# Patient Record
Sex: FEMALE | Race: WHITE | NOT HISPANIC OR LATINO | Employment: UNEMPLOYED | URBAN - METROPOLITAN AREA
[De-identification: names, ages, dates, MRNs, and addresses within clinical notes are randomized per-mention and may not be internally consistent; named-entity substitution may affect disease eponyms.]

---

## 2023-08-15 PROBLEM — R10.2 PELVIC PAIN: Status: ACTIVE | Noted: 2023-08-15

## 2023-08-15 PROBLEM — C50.919 BREAST CANCER (MULTI): Status: ACTIVE | Noted: 2023-08-15

## 2023-08-15 PROBLEM — N95.2 ATROPHY OF VAGINA: Status: ACTIVE | Noted: 2023-08-15

## 2023-08-15 PROBLEM — M81.8 OTHER OSTEOPOROSIS WITHOUT CURRENT PATHOLOGICAL FRACTURE: Status: ACTIVE | Noted: 2020-10-16

## 2023-08-15 PROBLEM — N95.0 POSTMENOPAUSAL BLEEDING: Status: ACTIVE | Noted: 2023-08-15

## 2023-08-15 PROBLEM — M79.643 HAND PAIN: Status: ACTIVE | Noted: 2023-08-15

## 2023-08-15 PROBLEM — R93.89 ENDOMETRIAL THICKENING ON ULTRASOUND: Status: ACTIVE | Noted: 2023-08-15

## 2023-08-15 PROBLEM — N39.0 URINARY TRACT INFECTION: Status: ACTIVE | Noted: 2023-08-15

## 2023-08-15 PROBLEM — Z90.13 ACQUIRED ABSENCE OF BILATERAL BREASTS AND NIPPLES: Status: ACTIVE | Noted: 2020-10-16

## 2023-08-15 PROBLEM — R35.0 URINARY FREQUENCY: Status: ACTIVE | Noted: 2023-08-15

## 2023-08-15 PROBLEM — R10.9 ABDOMINAL PAIN: Status: ACTIVE | Noted: 2023-08-15

## 2023-08-15 PROBLEM — M85.80 OSTEOPENIA: Status: ACTIVE | Noted: 2023-08-15

## 2023-08-15 RX ORDER — ASPIRIN 81 MG/1
81 TABLET ORAL
COMMUNITY

## 2023-08-15 RX ORDER — CETIRIZINE HYDROCHLORIDE, PSEUDOEPHEDRINE HYDROCHLORIDE 5; 120 MG/1; MG/1
1 TABLET, FILM COATED, EXTENDED RELEASE ORAL EVERY 12 HOURS
COMMUNITY
Start: 2014-05-19 | End: 2023-10-02

## 2023-08-15 RX ORDER — CALCIUM CARBONATE 600 MG
600 TABLET ORAL
COMMUNITY
Start: 2020-10-16 | End: 2023-10-02

## 2023-08-15 RX ORDER — ALENDRONATE SODIUM 70 MG/1
70 TABLET ORAL
COMMUNITY
Start: 2015-01-08

## 2023-08-15 RX ORDER — VIT C/E/ZN/COPPR/LUTEIN/ZEAXAN 250MG-90MG
25 CAPSULE ORAL
COMMUNITY

## 2023-08-15 RX ORDER — MULTIVITAMIN
TABLET ORAL
COMMUNITY

## 2023-08-15 RX ORDER — ANASTROZOLE 1 MG/1
1 TABLET ORAL
COMMUNITY
Start: 2013-08-01 | End: 2023-10-02

## 2023-10-02 ENCOUNTER — OFFICE VISIT (OUTPATIENT)
Dept: OBSTETRICS AND GYNECOLOGY | Facility: CLINIC | Age: 58
End: 2023-10-02
Payer: COMMERCIAL

## 2023-10-02 VITALS
BODY MASS INDEX: 26.2 KG/M2 | HEIGHT: 66 IN | DIASTOLIC BLOOD PRESSURE: 66 MMHG | SYSTOLIC BLOOD PRESSURE: 115 MMHG | WEIGHT: 163 LBS

## 2023-10-02 DIAGNOSIS — Z01.419 ENCOUNTER FOR GYNECOLOGICAL EXAMINATION WITHOUT ABNORMAL FINDING: Primary | ICD-10-CM

## 2023-10-02 PROCEDURE — 99396 PREV VISIT EST AGE 40-64: CPT | Performed by: OBSTETRICS & GYNECOLOGY

## 2023-10-02 PROCEDURE — 1036F TOBACCO NON-USER: CPT | Performed by: OBSTETRICS & GYNECOLOGY

## 2023-10-02 NOTE — PROGRESS NOTES
Subjective   Adrianna Quinones is a 57 y.o. female here for a routine exam. Current complaints: She has no concerns.  She has a history of bilateral mastectomies and no longer needs breast imaging.  She stopped anastrozole in December 2022.  She has been on Fosamax for 10 years, the same amount of time as anastrozole.  Her last bone density in January 2023 showed osteopenia, T score -1.6.. Personal health questionnaire reviewed: yes.     Gynecologic History  Patient's last menstrual period was 08/01/2011.  Contraception: post menopausal status  Last Pap: 9/26/22. Results were: normal  Last mammogram: none. Results were:  Bilateral mastectomies.    Obstetric History  OB History   No obstetric history on file.       Objective   Constitutional: Alert and in no acute distress. Well developed, well nourished.   Head and Face: Head and face: Normal.    Eyes: Normal external exam - nonicteric sclera, extraocular movements intact (EOMI) and no ptosis.   Neck: No neck asymmetry. Supple. Thyroid not enlarged and there were no palpable thyroid nodules.    Pulmonary: No respiratory distress.   Chest: Breasts: Consistent with bilateral mastectomies with implants, no skin changes. Palpation of breasts and axillae: No palpable mass and no axillary lymphadenopathy.   Abdomen: Soft nontender; no abdominal mass palpated. No organomegaly. No hernias.   Genitourinary: External genitalia: Normal. No inguinal lymphadenopathy. Bartholin's Urethral and Skenes Glands: Normal. Urethra: Normal.  Bladder: Normal on palpation. Vagina: Normal. Cervix: Normal.  Uterus: Normal.  Right Adnexa/parametria: Normal.  Left Adnexa/parametria: Normal.  Inspection of Perianal Area: Normal.  No Pap smear sent..  Musculoskeletal: No joint swelling seen, normal movements of all extremities.   Skin: Normal skin color and pigmentation, normal skin turgor, and no rash.   Neurologic: Non-focal. Grossly intact.   Psychiatric: Alert and oriented x 3. Affect normal to  patient baseline. Mood: Appropriate.  Physical Exam     Assessment/Plan   Healthy female exam.    Education reviewed: calcium supplements and we discussed a bone density test is due in January 2025.  As she has been on Fosamax for 10 years she can discontinue.  This will coincide with stopping anastrozole.  She will call me with any concerns, I will see her routinely in 1 year.  No Pap smear was sent..

## 2023-10-11 ENCOUNTER — LAB (OUTPATIENT)
Dept: LAB | Facility: LAB | Age: 58
End: 2023-10-11
Payer: COMMERCIAL

## 2023-10-11 DIAGNOSIS — Z13.6 SCREENING FOR ISCHEMIC HEART DISEASE: ICD-10-CM

## 2023-10-11 DIAGNOSIS — R73.01 IMPAIRED FASTING GLUCOSE: ICD-10-CM

## 2023-10-11 LAB
ALBUMIN SERPL BCP-MCNC: 4.5 G/DL (ref 3.4–5)
ALP SERPL-CCNC: 61 U/L (ref 33–110)
ALT SERPL W P-5'-P-CCNC: 13 U/L (ref 7–45)
ANION GAP SERPL CALC-SCNC: 11 MMOL/L (ref 10–20)
AST SERPL W P-5'-P-CCNC: 14 U/L (ref 9–39)
BILIRUB SERPL-MCNC: 0.6 MG/DL (ref 0–1.2)
BUN SERPL-MCNC: 15 MG/DL (ref 6–23)
CALCIUM SERPL-MCNC: 9.8 MG/DL (ref 8.6–10.3)
CHLORIDE SERPL-SCNC: 104 MMOL/L (ref 98–107)
CHOLEST SERPL-MCNC: 215 MG/DL (ref 0–199)
CHOLESTEROL/HDL RATIO: 3
CO2 SERPL-SCNC: 29 MMOL/L (ref 21–32)
CREAT SERPL-MCNC: 0.85 MG/DL (ref 0.5–1.05)
EST. AVERAGE GLUCOSE BLD GHB EST-MCNC: 126 MG/DL
GFR SERPL CREATININE-BSD FRML MDRD: 80 ML/MIN/1.73M*2
GLUCOSE SERPL-MCNC: 112 MG/DL (ref 74–99)
HBA1C MFR BLD: 6 %
HDLC SERPL-MCNC: 70.5 MG/DL
LDLC SERPL CALC-MCNC: 125 MG/DL (ref 140–190)
NON HDL CHOLESTEROL: 145 MG/DL (ref 0–149)
POTASSIUM SERPL-SCNC: 5 MMOL/L (ref 3.5–5.3)
PROT SERPL-MCNC: 6.7 G/DL (ref 6.4–8.2)
SODIUM SERPL-SCNC: 139 MMOL/L (ref 136–145)
TRIGL SERPL-MCNC: 96 MG/DL (ref 0–149)
VLDL: 19 MG/DL (ref 0–40)

## 2023-10-11 PROCEDURE — 80061 LIPID PANEL: CPT

## 2023-10-11 PROCEDURE — 83036 HEMOGLOBIN GLYCOSYLATED A1C: CPT

## 2023-10-11 PROCEDURE — 80053 COMPREHEN METABOLIC PANEL: CPT

## 2023-10-11 PROCEDURE — 36415 COLL VENOUS BLD VENIPUNCTURE: CPT

## 2023-10-18 ENCOUNTER — OFFICE VISIT (OUTPATIENT)
Dept: PRIMARY CARE | Facility: CLINIC | Age: 58
End: 2023-10-18
Payer: COMMERCIAL

## 2023-10-18 VITALS
SYSTOLIC BLOOD PRESSURE: 120 MMHG | OXYGEN SATURATION: 98 % | HEART RATE: 78 BPM | BODY MASS INDEX: 27.32 KG/M2 | DIASTOLIC BLOOD PRESSURE: 74 MMHG | TEMPERATURE: 97.6 F | HEIGHT: 65 IN | WEIGHT: 164 LBS

## 2023-10-18 DIAGNOSIS — E78.5 HYPERLIPIDEMIA, UNSPECIFIED HYPERLIPIDEMIA TYPE: Chronic | ICD-10-CM

## 2023-10-18 DIAGNOSIS — R73.03 PREDIABETES: Chronic | ICD-10-CM

## 2023-10-18 DIAGNOSIS — Z85.3 HISTORY OF BREAST CANCER: ICD-10-CM

## 2023-10-18 DIAGNOSIS — Z00.00 WELLNESS EXAMINATION: Primary | ICD-10-CM

## 2023-10-18 DIAGNOSIS — M81.0 AGE-RELATED OSTEOPOROSIS WITHOUT CURRENT PATHOLOGICAL FRACTURE: ICD-10-CM

## 2023-10-18 DIAGNOSIS — Z90.13 ACQUIRED ABSENCE OF BILATERAL BREASTS AND NIPPLES: ICD-10-CM

## 2023-10-18 PROBLEM — R35.0 URINARY FREQUENCY: Status: RESOLVED | Noted: 2023-08-15 | Resolved: 2023-10-18

## 2023-10-18 PROBLEM — N95.0 POSTMENOPAUSAL BLEEDING: Status: RESOLVED | Noted: 2023-08-15 | Resolved: 2023-10-18

## 2023-10-18 PROBLEM — M85.80 OSTEOPENIA: Chronic | Status: ACTIVE | Noted: 2023-08-15

## 2023-10-18 PROCEDURE — 1036F TOBACCO NON-USER: CPT | Performed by: FAMILY MEDICINE

## 2023-10-18 PROCEDURE — 99213 OFFICE O/P EST LOW 20 MIN: CPT | Performed by: FAMILY MEDICINE

## 2023-10-18 PROCEDURE — 99396 PREV VISIT EST AGE 40-64: CPT | Performed by: FAMILY MEDICINE

## 2023-10-18 NOTE — PROGRESS NOTES
"Subjective   Patient ID: Adrianna Quinones is a 57 y.o. female who presents for Osteoarthritis (Recheck. Review labs) and Annual Exam (Cpe.).    Adrianna presents for annual exam. She has been feeling well.No new concerns.          Review of Systems   Constitutional:  Negative for appetite change, chills, fatigue and fever.   HENT:  Negative for congestion, rhinorrhea, sore throat and voice change.    Eyes:  Negative for visual disturbance.   Respiratory:  Negative for cough and shortness of breath.    Cardiovascular:  Negative for chest pain and palpitations.   Gastrointestinal:  Negative for abdominal pain, constipation, diarrhea, nausea and vomiting.   Genitourinary:  Negative for dysuria.   Skin:  Negative for rash.   Neurological:  Negative for dizziness.   Psychiatric/Behavioral:  Negative for sleep disturbance.        Objective   /74   Pulse 78   Temp 36.4 °C (97.6 °F)   Ht 1.651 m (5' 5\")   Wt 74.4 kg (164 lb)   LMP 08/01/2011   SpO2 98%   BMI 27.29 kg/m²     Physical Exam  Constitutional:       General: She is not in acute distress.     Appearance: Normal appearance.   HENT:      Head: Normocephalic.      Nose: No congestion.      Mouth/Throat:      Mouth: Mucous membranes are moist.   Eyes:      Extraocular Movements: Extraocular movements intact.      Conjunctiva/sclera: Conjunctivae normal.   Cardiovascular:      Rate and Rhythm: Normal rate and regular rhythm.      Heart sounds: No murmur heard.  Pulmonary:      Effort: Pulmonary effort is normal.      Breath sounds: No wheezing or rhonchi.   Abdominal:      Palpations: Abdomen is soft.      Tenderness: There is no abdominal tenderness.   Musculoskeletal:         General: No swelling or tenderness.   Skin:     General: Skin is warm and dry.   Neurological:      General: No focal deficit present.      Mental Status: She is alert.   Psychiatric:         Mood and Affect: Mood normal.         Behavior: Behavior normal.         Assessment/Plan "   Problem List Items Addressed This Visit             ICD-10-CM    Acquired absence of bilateral breasts and nipples Z90.13    History of breast cancer Z85.3    Age-related osteoporosis without current pathological fracture (Chronic) M81.0    Prediabetes (Chronic) R73.03     Counseled on lifestyle changes. Patient will work with daughter who is dietician.          Relevant Orders    Comprehensive Metabolic Panel    Hemoglobin A1C    Hyperlipidemia (Chronic) E78.5     Lipid panel reviewed. ASCVD risk score 2.1%. Statin not indicated.          Relevant Orders    Lipid Panel    Comprehensive Metabolic Panel     Other Visit Diagnoses         Codes    Wellness examination    -  Primary Z00.00    Vaccines reviewed. Discussed option for booster for hepatitis B and MMR. Due for Tdap vaccine.

## 2023-10-18 NOTE — PATIENT INSTRUCTIONS
Check out red yeast rice supplement for cholesterol.     Go to pharmacy for Tdap vaccine (tetanus booster)

## 2023-10-19 ASSESSMENT — ENCOUNTER SYMPTOMS
DIARRHEA: 0
SORE THROAT: 0
PALPITATIONS: 0
CONSTIPATION: 0
DIZZINESS: 0
CHILLS: 0
RHINORRHEA: 0
SHORTNESS OF BREATH: 0
FATIGUE: 0
NAUSEA: 0
VOMITING: 0
DYSURIA: 0
SLEEP DISTURBANCE: 0
VOICE CHANGE: 0
COUGH: 0
APPETITE CHANGE: 0
FEVER: 0
ABDOMINAL PAIN: 0

## 2023-12-07 ENCOUNTER — OFFICE VISIT (OUTPATIENT)
Dept: HEMATOLOGY/ONCOLOGY | Facility: CLINIC | Age: 58
End: 2023-12-07
Payer: COMMERCIAL

## 2023-12-07 VITALS
WEIGHT: 164.9 LBS | BODY MASS INDEX: 27.44 KG/M2 | DIASTOLIC BLOOD PRESSURE: 83 MMHG | TEMPERATURE: 97.3 F | HEART RATE: 90 BPM | SYSTOLIC BLOOD PRESSURE: 144 MMHG

## 2023-12-07 DIAGNOSIS — Z85.3 HISTORY OF CANCER OF RIGHT BREAST: Primary | ICD-10-CM

## 2023-12-07 DIAGNOSIS — Z92.21 HISTORY OF AROMATASE INHIBITOR THERAPY: ICD-10-CM

## 2023-12-07 PROCEDURE — 99213 OFFICE O/P EST LOW 20 MIN: CPT | Performed by: NURSE PRACTITIONER

## 2023-12-07 PROCEDURE — 1036F TOBACCO NON-USER: CPT | Performed by: NURSE PRACTITIONER

## 2023-12-07 ASSESSMENT — PAIN SCALES - GENERAL: PAINLEVEL: 0-NO PAIN

## 2023-12-07 NOTE — PATIENT INSTRUCTIONS
1. Exercise 2.5 hours per week; bone strengthening, cardio-vascular, resistance training.  2. Please do self breast exams monthly.  3. Keep alcohol under 3 drinks per week.  4. Sun safety - limit sun exposure from 11a-2p when its at its hottest, apply 15-30 sun block and re-apply every 1-2 hours if perspiring or swimming.  5. Eat a plant based diet, add in oily fishes such as mackerel, tuna, and salmon.  6. Get in at least 1,000 mg of calcium per day through diet or supplement for bone strength. Examples of foods higher in calcium are milk, yogurt, fruited yogurt, oranges, fortified orange juice, almonds, almond milk, broccoli, spinach, bok francoise, mustard greens, puddings, custards, ice cream, fortified cereals, bars, and crackers.   7. Stop Fosamax once you complete your current bottle. Bone density testing can be done through Dr. Gaston.  8. You can consider a rupture protocol MRI in the future since the implants are over 10 years old.  9. Please call the office if any new mass or rash in or around breast, or any uncontrolled symptoms that last over 2-3 weeks at [phone].  10. It was nice seeing you today, Adrianna. I will see you back on an as needed basis. Please don't hesitate to call or reach me through [employer] if you have any concerns. It has been my privilege to have been your oncology provider, albeit for one year! I wish you the very best, Adrianna.

## 2023-12-07 NOTE — PROGRESS NOTES
Oncology Follow-Up    Adrianna Quinones  [id number]        Breast Cancer         AJCC Edition: 7th, Diagnosis Date: 22-Jun-2011, Stage IIA, T1c pN1 M0   Oncology History    No history exists.     Treatment Synopsis:       1. Multifocal right-sided breast cancer with 2 areas of invasion measuring 1.2 cm and 0.7 cm respectively. Modified Madrid-Meyers grade 2. ER strongly positive at 100%, TX strongly positive at  100%, and HER-2/cedric negative by FISH with an amplification of 1.2.   2. Status post bilateral mastectomies in July 2011. Holcomb lymph node evaluation positive for 1 of 2 sentinel lymph nodes involved with cancer with evidence of extracapsular extension. Axillary lymph node dissection of 34 other lymph nodes negative  for metastases.   3. Status post 6 cycles of adjuvant TAC chemotherapy.   4. Status post adjuvant radiation therapy to the right mastectomy site.   5. Started on tamoxifen in April 2011 which was later changed to anastrozole in July 2012. Treatment completed December 2022.      Cleveland Rodas presents for her yearly oncology visit accompanied by her . She feels well. Her only new issue is that she recently had some moles removed. She sees dermatology as she has had pre-cancerous moles in the past. Adrianna rates her energy level as 9/10 and her distress score is 2/10 not related to cancer. Adrianna asks about coming off of fosamax. She has been on it since she started anastrozole. Adrianna denies any unusual headaches, balance issues, depression, cough, shortness of breath, problems swallowing, changes in chest/breast area, abdominal pain, bone or muscle pain, vaginal bleeding, rectal bleeding, blood in the urine, vaginal dryness, swelling arms or legs, new or unusual skin moles or lesions.         Objective      Vitals:    12/07/23 0946   BP: 144/83   Pulse: 90   Temp: 36.3 °C (97.3 °F)        Constitutional: Well developed, alert/oriented x3, no distress, cooperative   Eyes: clear sclera  "  ENMT: mucous membranes moist, no apparent lesions   Head/Neck: Neck supple, no bruits   Respiratory/Thorax: Patent airways, normal breath sounds with good chest expansion   Cardiovascular: Regular rate and rhythm, no murmurs, 2+ equal pulses of the extremities,   Gastrointestinal: Nondistended, soft, non-tender, no masses palpable, no organomegaly   Musculoskeletal: ROM intact, no joint swelling, normal strength   Extremities: normal extremities, no edema, cyanosis, contusions or wounds   Neurological: alert and oriented x3,  normal strength   Breast: bilateral mastectomy with implant reconstruction without masses, nodules, skin changes. Right breast with more central scarring   Lymphatic: No significant lymphadenopathy   Psychological: Appropriate mood and behavior   Skin: Warm and dry, no lesions, no rashes      Physical Exam     No results found for: \"WBC\", \"HGB\", \"HCT\", \"MCV\", \"PLT\"    Chemistry    Lab Results   Component Value Date/Time     10/11/2023 0901    K 5.0 10/11/2023 0901     10/11/2023 0901    CO2 29 10/11/2023 0901    BUN 15 10/11/2023 0901    CREATININE 0.85 10/11/2023 0901    Lab Results   Component Value Date/Time    CALCIUM 9.8 10/11/2023 0901    ALKPHOS 61 10/11/2023 0901    AST 14 10/11/2023 0901    ALT 13 10/11/2023 0901    BILITOT 0.6 10/11/2023 0901              Imaging:  Name:          SUBHA SALGADO  Patient ID:    [id number]  Age:           57  Sex:           Female  Ethnicity:     White  YOB: 1965      -----------------------------------------------------------------     Indication: osteopenia; prior fracture;     Referring Provider: RAFA GIMENEZ     Study: Bone densitometry was performed.     Exam Date: January 09, 2023     Accession number: [id number]        Bone Density:  -----------------------------------------------------------------  Region                   BMD    T-score  Z-score   " Classification  -----------------------------------------------------------------  AP Spine(L1-L4)          0.875   -1.6     -0.3       Osteopenia  Femoral Neck (Left)      0.722   -1.1      0.0       Osteopenia  Total Hip (Left)         0.887   -0.4      0.4       Normal  -----------------------------------------------------------------     World Health Organization criteria for BMD impression   classify patients as:   Normal (T-score at or above -1.0),   Osteopenia (T-score between -1.0 and -2.5), or   Osteoporosis (T-score at or below -2.5).      10-year Fracture Risk(1):  -----------------------------------------------------------------  Major Osteoporotic Fracture            12%  Hip Fracture                           0.7%  -----------------------------------------------------------------  Reported Risk Factors:  [place] (), Neck BMD=0.722, BMI=25.8, previous fracture  (1) FRAX(R) Version 3.08. Fracture probability calculated for an   untreated patient. Fracture probability may be lower if the   patient has received treatment.        Previous Exams:  -----------------------------------------------------------------  Region  Exam     Age  BMD   T-score  BMD Change     BMD Change           Date          g/cm2          vs Baseline    vs Previous  -----------------------------------------------------------------  AP Spine(L1-L4)       01/09/2023  57  0.875    -1.6      -16.7%         4.3%*            12/29/2020  55  0.839    -1.9      -20.1%         -0.5%            12/18/2018  53  0.843    -1.9      -19.7%         -10.6%*          01/03/2012  46  0.943    -0.9      -10.2%         -9.7%            05/22/2008  42  1.045     0.0      -0.5%          -0.5%            02/24/2003  37  1.050     0.0                                    Total Hip(Left)       01/09/2023  57  0.887    -0.4      15.5%           1.3%            12/29/2020  55  0.876    -0.5      14.1%          -1.8%            12/18/2018  53  0.892    -0.4       16.1%          28.1%*           01/03/2012  46  0.696    -2.0      -9.3%          -4.0%            05/22/2008  42  0.725    -1.8      -5.6%*         -5.6%*           02/24/2003  37  0.768    -1.4                                 -----------------------------------------------------------------  *Denotes significance at 95% confidence level, LSC for AP Spine   = 0.022 g/cm2,  LSC for Total Hip = 0.027 g/cm2            Impression: The patient has low bone mass, based on the Total   Spine T-score. The patient has an estimated ten-year risk of   hip fracture of 0.7% and an estimated ten-year risk of major   fracture of 12%, based on the WHO FRAX algorithm. The patient   has risk factors, includi    Assessment/Plan    Adrianna is a 59 yo woman with a remote hx of T1cN1 right multifocal breast cancer diagnosed in June 2011. She is s/p bilatearl mastectomy with silicone implant reconstruction, TAC chemotherapy, XRT, and completed 10 years of anastrozole in December 2022. There is no evidence of recurrent disease on today's exam.   Plan:  Exam is negative.  Stop Fosamax as she has been on a long course.   Continue seeing dermatology for hx of pre-cancerous moles  See Dr. Gaston and Dr. Palmer at least yearly.  Can consider an implant protocol MRI since implants are over 10 years old. Prefers to wait a few more years before having them removed.  We reviewed signs/symptoms of recurrence including new masses, new pigmented lesion, tugging or pulling of the skin, nipple discharge, rash in or around the chest area, or any new finding that doesn't resolve within a 2-3 weeks.  Over 20 minutes of time was spent with this patient with >50% of the time with education, counseling, and coordination of care.   Will see Adrianna on an as needed basis moving forward. She will continue oncology follow up with Dr. Gaston. She is aware that I am always available if she has any concerns.    Diagnoses and all orders for this  visit:  History of cancer of right breast  History of aromatase inhibitor therapy          Lamar Villalpando, APRN-CNP

## 2024-10-11 ENCOUNTER — APPOINTMENT (OUTPATIENT)
Dept: OBSTETRICS AND GYNECOLOGY | Facility: CLINIC | Age: 59
End: 2024-10-11
Payer: COMMERCIAL

## 2024-10-11 VITALS
WEIGHT: 157 LBS | HEART RATE: 80 BPM | SYSTOLIC BLOOD PRESSURE: 112 MMHG | DIASTOLIC BLOOD PRESSURE: 75 MMHG | HEIGHT: 66 IN | BODY MASS INDEX: 25.23 KG/M2

## 2024-10-11 DIAGNOSIS — Z01.419 ENCOUNTER FOR GYNECOLOGICAL EXAMINATION WITHOUT ABNORMAL FINDING: ICD-10-CM

## 2024-10-11 DIAGNOSIS — M81.0 AGE-RELATED OSTEOPOROSIS WITHOUT CURRENT PATHOLOGICAL FRACTURE: Primary | Chronic | ICD-10-CM

## 2024-10-11 DIAGNOSIS — Z12.11 SCREEN FOR COLON CANCER: ICD-10-CM

## 2024-10-11 PROCEDURE — 3008F BODY MASS INDEX DOCD: CPT | Performed by: OBSTETRICS & GYNECOLOGY

## 2024-10-11 PROCEDURE — 99396 PREV VISIT EST AGE 40-64: CPT | Performed by: OBSTETRICS & GYNECOLOGY

## 2024-10-11 NOTE — PROGRESS NOTES
Subjective   Adrianna Quinones is a 58 y.o. female here for a routine exam.  She has no postmenopausal bleeding or discharge.  No dysuria or change in bowel habits.  She is current on her colonoscopy.    She has a history of bilateral mastectomy.  She is off anastrozole and has stopped Fosamax.  Her last bone density in 2023 showed osteopenia of the spine, T-score -1.6.    Personal health questionnaire reviewed: yes.     Gynecologic History  Patient's last menstrual period was 2011.  Contraception: post menopausal status  Last Pap: 22. Results were: normal  Last mammogram: History of mastectomy. Results were:  n/a    Obstetric History  OB History    Para Term  AB Living   4 3           SAB IAB Ectopic Multiple Live Births                  # Outcome Date GA Lbr Henrik/2nd Weight Sex Type Anes PTL Lv   4             3 Para            2 Para            1 Para                Objective   Constitutional: Alert and in no acute distress. Well developed, well nourished.   Head and Face: Head and face: Normal.    Eyes: Normal external exam - nonicteric sclera, extraocular movements intact (EOMI) and no ptosis.   Neck: No neck asymmetry. Supple. Thyroid not enlarged and there were no palpable thyroid nodules.    Pulmonary: No respiratory distress.   Chest: Breasts: Normal appearance,  no skin changes. Palpation of breasts and axillae: No palpable mass and no axillary lymphadenopathy.  Consistent with bilateral mastectomy.  Abdomen: Soft nontender; no abdominal mass palpated. No organomegaly. No hernias.   Genitourinary: External genitalia: Normal. No inguinal lymphadenopathy. Bartholin's Urethral and Skenes Glands: Normal. Urethra: Normal.  Bladder: Normal on palpation. Vagina: Normal. Cervix: Normal.  Uterus: Normal.  Right Adnexa/parametria: Normal.  Left Adnexa/parametria: Normal.  Inspection of Perianal Area: Normal.   Musculoskeletal: No joint swelling seen, normal movements of all  extremities.   Skin: Normal skin color and pigmentation, normal skin turgor, and no rash.   Neurologic: Non-focal. Grossly intact.   Psychiatric: Alert and oriented x 3. Affect normal to patient baseline. Mood: Appropriate.  Physical Exam     Assessment/Plan   Healthy female exam.  This is a 58-year-old female with a normal exam.  No Pap smear was sent, she is high risk HPV negative and 2022.    Her bone density is due in January 2025 and this was ordered to monitor the osteopenia.    We discussed colon cancer screening options and a colonoscopy was ordered.    I will see her in 1 year.  Education reviewed: self breast exams.

## 2024-10-14 ENCOUNTER — LAB (OUTPATIENT)
Dept: LAB | Facility: LAB | Age: 59
End: 2024-10-14
Payer: COMMERCIAL

## 2024-10-14 DIAGNOSIS — R73.03 PREDIABETES: Chronic | ICD-10-CM

## 2024-10-14 DIAGNOSIS — E78.5 HYPERLIPIDEMIA, UNSPECIFIED HYPERLIPIDEMIA TYPE: Chronic | ICD-10-CM

## 2024-10-14 LAB
ALBUMIN SERPL BCP-MCNC: 4.4 G/DL (ref 3.4–5)
ALP SERPL-CCNC: 51 U/L (ref 33–110)
ALT SERPL W P-5'-P-CCNC: 13 U/L (ref 7–45)
ANION GAP SERPL CALC-SCNC: 10 MMOL/L (ref 10–20)
AST SERPL W P-5'-P-CCNC: 14 U/L (ref 9–39)
BILIRUB SERPL-MCNC: 0.6 MG/DL (ref 0–1.2)
BUN SERPL-MCNC: 19 MG/DL (ref 6–23)
CALCIUM SERPL-MCNC: 9.8 MG/DL (ref 8.6–10.3)
CHLORIDE SERPL-SCNC: 102 MMOL/L (ref 98–107)
CHOLEST SERPL-MCNC: 183 MG/DL (ref 0–199)
CHOLESTEROL/HDL RATIO: 2.9
CO2 SERPL-SCNC: 31 MMOL/L (ref 21–32)
CREAT SERPL-MCNC: 0.75 MG/DL (ref 0.5–1.05)
EGFRCR SERPLBLD CKD-EPI 2021: >90 ML/MIN/1.73M*2
GLUCOSE SERPL-MCNC: 101 MG/DL (ref 74–99)
HDLC SERPL-MCNC: 63.6 MG/DL
LDLC SERPL CALC-MCNC: 99 MG/DL
NON HDL CHOLESTEROL: 119 MG/DL (ref 0–149)
POTASSIUM SERPL-SCNC: 4.4 MMOL/L (ref 3.5–5.3)
PROT SERPL-MCNC: 7 G/DL (ref 6.4–8.2)
SODIUM SERPL-SCNC: 139 MMOL/L (ref 136–145)
TRIGL SERPL-MCNC: 100 MG/DL (ref 0–149)
VLDL: 20 MG/DL (ref 0–40)

## 2024-10-14 PROCEDURE — 36415 COLL VENOUS BLD VENIPUNCTURE: CPT

## 2024-10-14 PROCEDURE — 83036 HEMOGLOBIN GLYCOSYLATED A1C: CPT

## 2024-10-14 PROCEDURE — 80061 LIPID PANEL: CPT

## 2024-10-14 PROCEDURE — 80053 COMPREHEN METABOLIC PANEL: CPT

## 2024-10-15 LAB
EST. AVERAGE GLUCOSE BLD GHB EST-MCNC: 128 MG/DL
HBA1C MFR BLD: 6.1 %

## 2024-10-17 ENCOUNTER — PREP FOR PROCEDURE (OUTPATIENT)
Dept: SURGERY | Facility: HOSPITAL | Age: 59
End: 2024-10-17
Payer: COMMERCIAL

## 2024-10-17 DIAGNOSIS — Z85.3 HISTORY OF RIGHT BREAST CANCER: Primary | ICD-10-CM

## 2024-10-18 NOTE — H&P (VIEW-ONLY)
History and Physical      Patient Info: Adrianna Quinones   : 1965      Vital Signs:         Date & Reason of Surgery:         Chief Complaint:         Past Medical History:   Right breast cancer diagnosed , G5, P3, 2 miscarriages within the first trimester.       Past Surgical History:   Bilateral mastectomy with right sentinel lymph node biopsy in , bilateral implant-based reconstruction  with IZA Garcia      Allergies:   sulfa - hives      Medications:   Vitamins, calcium      Family History:   Denies known family history of breast disease, breast cancer, endocrine cancer, bleeding or clotting disorders or problems with anesthesia.      Social History:   Presents today with , [employer].  Denies tobacco, nicotine, recreational drugs.  Does consume alcohol on weekends.  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  [employer].      Review of Systems   Denies GI, , Respiratory, Cardiovascular, Endocrine, MSK, Neurologic, Immune, or Rheumatologic complaints.   System Normal Abnormal Comments   Skin [] []    HEENT [] []    Neck [] []    Chest [] [] no palpable masses, no axillary lymphadenopathy.  Healed surgical scars including transverse scars of the bilateral breast with nipple reconstruction more prominent on the left wiht loss of projection on the right.  There is skin contour irregularity on the anterior/lower pole of the right, radiated breast.  Grade 3 capsule with superior displacement of the implant on the right, grade 1 capsule on the left.  There is natural appearing ptosis of the left breast with slight contour irregularity just below the transverse incision and nipple reconstruction.  Submuscular implants with animation deformity.   Abdomen [] []    Well-appearing woman consistent with stated age.  Normal affect with appropriate responses.  Normal work of breathing with even quiet chest rise on room air.  Warm extremities, normal cap refill, no pedal edema.    SN-N   L  N-IMF 5/8 L  BW 12-13  Estimated implant size 250-350 cc   Impression / Plan:     1. Asymmetry of reconstructed breasts with history of right breast cancer and radiation treatment  2. Desire for larger breasts as well as improvement in asymmetry of reconstruction     Bilateral implant exchange for larger (by about 150-200 cc) silicone breast implants  Prefers natural look - responsive gel  Significant capsule modification of bilateral implant pockets right > left with possible use of ADM  Fat grafting to breasts right>left from abdomen/flanks  Possible nipple reconstruction revision    Risks of liposuction include but are not limited to: bleeding/hematoma, seroma, infection, need for reoperation, poor scars, injury to nearby structures, asymmetry, DVT/PE, under or over-correction, contour irregularity, poor cosmetic result. Risks of fat transfer include infection, poor take, fat necrosis, oil cyst, irregularity, palpability and others.    We discussed risks of breast implants, including cosmetic risks such as malposition, rippling, animation deformity.  We also discussed risks of capsular contracture, hematoma, seroma, infection, need for reoperation, implant rupture.  Additional risks including breast implant illness, breast implant associated anaplastic large cell lymphoma, and my recommendation that the patient plan for exchange or revisionary procedures in 10-year increments because implants are not meant to be permanent devices.  We also reviewed FDA guidelines that patient's undergoing breast augmentation with silicone implants should have an MRI performed 5 to 6 years following augmentation, and every 2 to 3 years following this.    Provider Signature:     Electronically signed by Lorrie Laguna  on 10/18/2024 at 7:52 AM

## 2024-10-18 NOTE — H&P
History and Physical      Patient Info: Adrianna Quinones   : 1965      Vital Signs:         Date & Reason of Surgery:         Chief Complaint:         Past Medical History:   Right breast cancer diagnosed , G5, P3, 2 miscarriages within the first trimester.       Past Surgical History:   Bilateral mastectomy with right sentinel lymph node biopsy in , bilateral implant-based reconstruction  with IZA Garcia      Allergies:   sulfa - hives      Medications:   Vitamins, calcium      Family History:   Denies known family history of breast disease, breast cancer, endocrine cancer, bleeding or clotting disorders or problems with anesthesia.      Social History:   Presents today with , [employer].  Denies tobacco, nicotine, recreational drugs.  Does consume alcohol on weekends.  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  [employer].      Review of Systems   Denies GI, , Respiratory, Cardiovascular, Endocrine, MSK, Neurologic, Immune, or Rheumatologic complaints.   System Normal Abnormal Comments   Skin [] []    HEENT [] []    Neck [] []    Chest [] [] no palpable masses, no axillary lymphadenopathy.  Healed surgical scars including transverse scars of the bilateral breast with nipple reconstruction more prominent on the left wiht loss of projection on the right.  There is skin contour irregularity on the anterior/lower pole of the right, radiated breast.  Grade 3 capsule with superior displacement of the implant on the right, grade 1 capsule on the left.  There is natural appearing ptosis of the left breast with slight contour irregularity just below the transverse incision and nipple reconstruction.  Submuscular implants with animation deformity.   Abdomen [] []    Well-appearing woman consistent with stated age.  Normal affect with appropriate responses.  Normal work of breathing with even quiet chest rise on room air.  Warm extremities, normal cap refill, no pedal edema.    SN-N   L  N-IMF 5/8 L  BW 12-13  Estimated implant size 250-350 cc   Impression / Plan:     1. Asymmetry of reconstructed breasts with history of right breast cancer and radiation treatment  2. Desire for larger breasts as well as improvement in asymmetry of reconstruction     Bilateral implant exchange for larger (by about 150-200 cc) silicone breast implants  Prefers natural look - responsive gel  Significant capsule modification of bilateral implant pockets right > left with possible use of ADM  Fat grafting to breasts right>left from abdomen/flanks  Possible nipple reconstruction revision    Risks of liposuction include but are not limited to: bleeding/hematoma, seroma, infection, need for reoperation, poor scars, injury to nearby structures, asymmetry, DVT/PE, under or over-correction, contour irregularity, poor cosmetic result. Risks of fat transfer include infection, poor take, fat necrosis, oil cyst, irregularity, palpability and others.    We discussed risks of breast implants, including cosmetic risks such as malposition, rippling, animation deformity.  We also discussed risks of capsular contracture, hematoma, seroma, infection, need for reoperation, implant rupture.  Additional risks including breast implant illness, breast implant associated anaplastic large cell lymphoma, and my recommendation that the patient plan for exchange or revisionary procedures in 10-year increments because implants are not meant to be permanent devices.  We also reviewed FDA guidelines that patient's undergoing breast augmentation with silicone implants should have an MRI performed 5 to 6 years following augmentation, and every 2 to 3 years following this.    Provider Signature:     Electronically signed by Lorrie Laguna  on 10/18/2024 at 7:52 AM

## 2024-10-21 ENCOUNTER — APPOINTMENT (OUTPATIENT)
Dept: PRIMARY CARE | Facility: CLINIC | Age: 59
End: 2024-10-21
Payer: COMMERCIAL

## 2024-10-21 VITALS
DIASTOLIC BLOOD PRESSURE: 82 MMHG | SYSTOLIC BLOOD PRESSURE: 124 MMHG | BODY MASS INDEX: 25.07 KG/M2 | HEIGHT: 66 IN | WEIGHT: 156 LBS | OXYGEN SATURATION: 98 % | HEART RATE: 83 BPM | TEMPERATURE: 97.9 F

## 2024-10-21 DIAGNOSIS — R73.03 PREDIABETES: Chronic | ICD-10-CM

## 2024-10-21 DIAGNOSIS — Z00.00 WELLNESS EXAMINATION: Primary | ICD-10-CM

## 2024-10-21 DIAGNOSIS — R19.7 DIARRHEA, UNSPECIFIED TYPE: ICD-10-CM

## 2024-10-21 DIAGNOSIS — E78.5 HYPERLIPIDEMIA, UNSPECIFIED HYPERLIPIDEMIA TYPE: Chronic | ICD-10-CM

## 2024-10-21 PROCEDURE — 99396 PREV VISIT EST AGE 40-64: CPT | Performed by: FAMILY MEDICINE

## 2024-10-21 PROCEDURE — 1036F TOBACCO NON-USER: CPT | Performed by: FAMILY MEDICINE

## 2024-10-21 PROCEDURE — 99213 OFFICE O/P EST LOW 20 MIN: CPT | Performed by: FAMILY MEDICINE

## 2024-10-21 PROCEDURE — 3008F BODY MASS INDEX DOCD: CPT | Performed by: FAMILY MEDICINE

## 2024-10-21 ASSESSMENT — ENCOUNTER SYMPTOMS
NAUSEA: 0
SHORTNESS OF BREATH: 0
PALPITATIONS: 0
APPETITE CHANGE: 0
FEVER: 0
FATIGUE: 0
SORE THROAT: 0
CONSTIPATION: 0
DIZZINESS: 0
ABDOMINAL PAIN: 0
COUGH: 0
DIARRHEA: 0
DYSURIA: 0
RHINORRHEA: 0
VOMITING: 0
CHILLS: 0

## 2024-10-21 ASSESSMENT — PATIENT HEALTH QUESTIONNAIRE - PHQ9
2. FEELING DOWN, DEPRESSED OR HOPELESS: NOT AT ALL
SUM OF ALL RESPONSES TO PHQ9 QUESTIONS 1 AND 2: 0
1. LITTLE INTEREST OR PLEASURE IN DOING THINGS: NOT AT ALL

## 2024-10-21 NOTE — PROGRESS NOTES
"Subjective   Patient ID: Adrianna Quinones is a 58 y.o. female who presents for Annual Exam (Review BW).    Adrianna has been feeling well. Did have a few weeks of diarrhea last month. Started when in [place] for child's wedding. Was feeling very stressed and eating out regularly. Had cramping diarrhea. No blood. Symptoms last about a weeks or two then resolved. No issues since. She is due for screening colonoscopy next year.          Review of Systems   Constitutional:  Negative for appetite change, chills, fatigue and fever.   HENT:  Negative for congestion, rhinorrhea and sore throat.    Eyes:  Negative for visual disturbance.   Respiratory:  Negative for cough and shortness of breath.    Cardiovascular:  Negative for chest pain and palpitations.   Gastrointestinal:  Negative for abdominal pain, constipation, diarrhea, nausea and vomiting.   Genitourinary:  Negative for dysuria.   Skin:  Negative for rash.   Neurological:  Negative for dizziness.       Objective   /82   Pulse 83   Temp 36.6 °C (97.9 °F)   Ht 1.676 m (5' 6\")   Wt 70.8 kg (156 lb)   LMP 08/01/2011   SpO2 98%   BMI 25.18 kg/m²     Physical Exam  Constitutional:       General: She is not in acute distress.  HENT:      Head: Normocephalic and atraumatic.      Right Ear: Tympanic membrane normal.      Left Ear: Tympanic membrane normal.      Nose: No congestion or rhinorrhea.      Mouth/Throat:      Mouth: Mucous membranes are moist.      Pharynx: No oropharyngeal exudate or posterior oropharyngeal erythema.   Eyes:      Extraocular Movements: Extraocular movements intact.   Cardiovascular:      Rate and Rhythm: Normal rate and regular rhythm.      Heart sounds: No murmur heard.  Pulmonary:      Effort: Pulmonary effort is normal.      Breath sounds: No wheezing or rhonchi.   Abdominal:      General: There is no distension.      Palpations: Abdomen is soft.      Tenderness: There is no abdominal tenderness. There is no guarding or rebound. "   Musculoskeletal:         General: No swelling or tenderness.      Cervical back: Neck supple.      Right lower leg: No edema.      Left lower leg: No edema.   Lymphadenopathy:      Cervical: No cervical adenopathy.   Skin:     General: Skin is warm and dry.   Neurological:      Mental Status: She is alert.      Cranial Nerves: No cranial nerve deficit.      Motor: No weakness.      Coordination: Coordination normal.      Gait: Gait normal.   Psychiatric:         Mood and Affect: Mood normal.         Behavior: Behavior normal.         Assessment/Plan   Diagnoses and all orders for this visit:  Wellness examination  Comments:  CPE performed. Recommed Tdap vaccine. Patient declines COVID and influenza vaccines.  Diarrhea, unspecified type  Comments:  Likely due to stress and eating out. Will monitor for recurrence since symptoms resolved at this time.  Prediabetes  Comments:  HgbA1c stable.  Orders:  -     Comprehensive Metabolic Panel; Future  -     Hemoglobin A1C; Future  -     CBC and Auto Differential; Future  Hyperlipidemia, unspecified hyperlipidemia type  Comments:  LDL improved. Statin not indicated.  Orders:  -     Lipid Panel; Future  -     Comprehensive Metabolic Panel; Future

## 2024-10-24 PROBLEM — Z85.3 HISTORY OF RIGHT BREAST CANCER: Status: ACTIVE | Noted: 2024-10-17

## 2024-10-28 ENCOUNTER — PRE-ADMISSION TESTING (OUTPATIENT)
Dept: PREADMISSION TESTING | Facility: HOSPITAL | Age: 59
End: 2024-10-28
Payer: COMMERCIAL

## 2024-10-28 VITALS
OXYGEN SATURATION: 99 % | TEMPERATURE: 96.4 F | HEIGHT: 66 IN | RESPIRATION RATE: 16 BRPM | WEIGHT: 158 LBS | BODY MASS INDEX: 25.39 KG/M2 | SYSTOLIC BLOOD PRESSURE: 120 MMHG | DIASTOLIC BLOOD PRESSURE: 77 MMHG | HEART RATE: 90 BPM

## 2024-10-28 DIAGNOSIS — Z01.810 PREOP CARDIOVASCULAR EXAM: Primary | ICD-10-CM

## 2024-10-28 LAB
BASOPHILS # BLD AUTO: 0.05 X10*3/UL (ref 0–0.1)
BASOPHILS NFR BLD AUTO: 1 %
EOSINOPHIL # BLD AUTO: 0.08 X10*3/UL (ref 0–0.7)
EOSINOPHIL NFR BLD AUTO: 1.7 %
ERYTHROCYTE [DISTWIDTH] IN BLOOD BY AUTOMATED COUNT: 11.4 % (ref 11.5–14.5)
HCT VFR BLD AUTO: 40.6 % (ref 36–46)
HGB BLD-MCNC: 13.1 G/DL (ref 12–16)
IMM GRANULOCYTES # BLD AUTO: 0 X10*3/UL (ref 0–0.7)
IMM GRANULOCYTES NFR BLD AUTO: 0 % (ref 0–0.9)
LYMPHOCYTES # BLD AUTO: 1.98 X10*3/UL (ref 1.2–4.8)
LYMPHOCYTES NFR BLD AUTO: 40.9 %
MCH RBC QN AUTO: 29.3 PG (ref 26–34)
MCHC RBC AUTO-ENTMCNC: 32.3 G/DL (ref 32–36)
MCV RBC AUTO: 91 FL (ref 80–100)
MONOCYTES # BLD AUTO: 0.41 X10*3/UL (ref 0.1–1)
MONOCYTES NFR BLD AUTO: 8.5 %
NEUTROPHILS # BLD AUTO: 2.32 X10*3/UL (ref 1.2–7.7)
NEUTROPHILS NFR BLD AUTO: 47.9 %
NRBC BLD-RTO: 0 /100 WBCS (ref 0–0)
PLATELET # BLD AUTO: 311 X10*3/UL (ref 150–450)
RBC # BLD AUTO: 4.47 X10*6/UL (ref 4–5.2)
WBC # BLD AUTO: 4.8 X10*3/UL (ref 4.4–11.3)

## 2024-10-28 PROCEDURE — 99203 OFFICE O/P NEW LOW 30 MIN: CPT | Performed by: NURSE PRACTITIONER

## 2024-10-28 PROCEDURE — 87081 CULTURE SCREEN ONLY: CPT | Mod: TRILAB,WESLAB

## 2024-10-28 PROCEDURE — 85025 COMPLETE CBC W/AUTO DIFF WBC: CPT

## 2024-10-28 PROCEDURE — 36415 COLL VENOUS BLD VENIPUNCTURE: CPT

## 2024-10-28 RX ORDER — CHLORHEXIDINE GLUCONATE ORAL RINSE 1.2 MG/ML
15 SOLUTION DENTAL DAILY
Qty: 30 ML | Refills: 0 | Status: SHIPPED | OUTPATIENT
Start: 2024-10-28 | End: 2024-10-30

## 2024-10-28 ASSESSMENT — ENCOUNTER SYMPTOMS
NEUROLOGICAL NEGATIVE: 1
GASTROINTESTINAL NEGATIVE: 1
ENDOCRINE NEGATIVE: 1
RESPIRATORY NEGATIVE: 1
EYES NEGATIVE: 1
NECK NEGATIVE: 1
MUSCULOSKELETAL NEGATIVE: 1
CONSTITUTIONAL NEGATIVE: 1
CARDIOVASCULAR NEGATIVE: 1

## 2024-10-28 ASSESSMENT — LIFESTYLE VARIABLES
SMOKING_STATUS: NONSMOKER
SMOKING_STATUS: NONSMOKER

## 2024-10-30 ENCOUNTER — ANESTHESIA EVENT (OUTPATIENT)
Dept: OPERATING ROOM | Facility: HOSPITAL | Age: 59
End: 2024-10-30
Payer: COMMERCIAL

## 2024-10-30 ENCOUNTER — PHARMACY VISIT (OUTPATIENT)
Dept: PHARMACY | Facility: CLINIC | Age: 59
End: 2024-10-30
Payer: COMMERCIAL

## 2024-10-30 ENCOUNTER — HOSPITAL ENCOUNTER (OUTPATIENT)
Facility: HOSPITAL | Age: 59
Setting detail: OUTPATIENT SURGERY
Discharge: HOME | End: 2024-10-30
Attending: STUDENT IN AN ORGANIZED HEALTH CARE EDUCATION/TRAINING PROGRAM | Admitting: STUDENT IN AN ORGANIZED HEALTH CARE EDUCATION/TRAINING PROGRAM
Payer: COMMERCIAL

## 2024-10-30 ENCOUNTER — ANESTHESIA (OUTPATIENT)
Dept: OPERATING ROOM | Facility: HOSPITAL | Age: 59
End: 2024-10-30
Payer: COMMERCIAL

## 2024-10-30 VITALS
HEART RATE: 80 BPM | SYSTOLIC BLOOD PRESSURE: 149 MMHG | DIASTOLIC BLOOD PRESSURE: 67 MMHG | RESPIRATION RATE: 16 BRPM | TEMPERATURE: 98.2 F | OXYGEN SATURATION: 95 %

## 2024-10-30 DIAGNOSIS — Z85.3 HISTORY OF RIGHT BREAST CANCER: Primary | ICD-10-CM

## 2024-10-30 LAB — STAPHYLOCOCCUS SPEC CULT: ABNORMAL

## 2024-10-30 PROCEDURE — 2500000004 HC RX 250 GENERAL PHARMACY W/ HCPCS (ALT 636 FOR OP/ED)

## 2024-10-30 PROCEDURE — 7100000002 HC RECOVERY ROOM TIME - EACH INCREMENTAL 1 MINUTE: Performed by: STUDENT IN AN ORGANIZED HEALTH CARE EDUCATION/TRAINING PROGRAM

## 2024-10-30 PROCEDURE — C1889 IMPLANT/INSERT DEVICE, NOC: HCPCS | Performed by: STUDENT IN AN ORGANIZED HEALTH CARE EDUCATION/TRAINING PROGRAM

## 2024-10-30 PROCEDURE — 7100000001 HC RECOVERY ROOM TIME - INITIAL BASE CHARGE: Performed by: STUDENT IN AN ORGANIZED HEALTH CARE EDUCATION/TRAINING PROGRAM

## 2024-10-30 PROCEDURE — 3600000003 HC OR TIME - INITIAL BASE CHARGE - PROCEDURE LEVEL THREE: Performed by: STUDENT IN AN ORGANIZED HEALTH CARE EDUCATION/TRAINING PROGRAM

## 2024-10-30 PROCEDURE — A4649 SURGICAL SUPPLIES: HCPCS | Performed by: STUDENT IN AN ORGANIZED HEALTH CARE EDUCATION/TRAINING PROGRAM

## 2024-10-30 PROCEDURE — 3600000008 HC OR TIME - EACH INCREMENTAL 1 MINUTE - PROCEDURE LEVEL THREE: Performed by: STUDENT IN AN ORGANIZED HEALTH CARE EDUCATION/TRAINING PROGRAM

## 2024-10-30 PROCEDURE — 2720000007 HC OR 272 NO HCPCS: Performed by: STUDENT IN AN ORGANIZED HEALTH CARE EDUCATION/TRAINING PROGRAM

## 2024-10-30 PROCEDURE — 3700000002 HC GENERAL ANESTHESIA TIME - EACH INCREMENTAL 1 MINUTE: Performed by: STUDENT IN AN ORGANIZED HEALTH CARE EDUCATION/TRAINING PROGRAM

## 2024-10-30 PROCEDURE — 7100000009 HC PHASE TWO TIME - INITIAL BASE CHARGE: Performed by: STUDENT IN AN ORGANIZED HEALTH CARE EDUCATION/TRAINING PROGRAM

## 2024-10-30 PROCEDURE — 3700000001 HC GENERAL ANESTHESIA TIME - INITIAL BASE CHARGE: Performed by: STUDENT IN AN ORGANIZED HEALTH CARE EDUCATION/TRAINING PROGRAM

## 2024-10-30 PROCEDURE — 7100000010 HC PHASE TWO TIME - EACH INCREMENTAL 1 MINUTE: Performed by: STUDENT IN AN ORGANIZED HEALTH CARE EDUCATION/TRAINING PROGRAM

## 2024-10-30 PROCEDURE — 2780000003 HC OR 278 NO HCPCS: Performed by: STUDENT IN AN ORGANIZED HEALTH CARE EDUCATION/TRAINING PROGRAM

## 2024-10-30 PROCEDURE — A9999 DME SUPPLY OR ACCESSORY, NOS: HCPCS | Performed by: STUDENT IN AN ORGANIZED HEALTH CARE EDUCATION/TRAINING PROGRAM

## 2024-10-30 PROCEDURE — 2500000004 HC RX 250 GENERAL PHARMACY W/ HCPCS (ALT 636 FOR OP/ED): Performed by: ANESTHESIOLOGIST ASSISTANT

## 2024-10-30 PROCEDURE — RXMED WILLOW AMBULATORY MEDICATION CHARGE

## 2024-10-30 PROCEDURE — 2500000005 HC RX 250 GENERAL PHARMACY W/O HCPCS: Performed by: STUDENT IN AN ORGANIZED HEALTH CARE EDUCATION/TRAINING PROGRAM

## 2024-10-30 PROCEDURE — 2500000004 HC RX 250 GENERAL PHARMACY W/ HCPCS (ALT 636 FOR OP/ED): Performed by: STUDENT IN AN ORGANIZED HEALTH CARE EDUCATION/TRAINING PROGRAM

## 2024-10-30 DEVICE — NATRELLE INSPIRA STY SRM-445 MODERATE PROFILE  SMOOTH ROUND SILICONE
Type: IMPLANTABLE DEVICE | Site: BREAST | Status: FUNCTIONAL
Brand: NATRELLE INSPIRA BREAST IMPLANTS

## 2024-10-30 DEVICE — ROUND MODERATE PLUS PROFILE  SALINE BREAST IMPLANT SIZER, 450CC
Type: IMPLANTABLE DEVICE | Site: BREAST | Status: FUNCTIONAL
Brand: MENTOR ROUND MODERATE PLUS PROFILE SINGLE USE SALINE BREAST IMPLANT SIZER

## 2024-10-30 RX ORDER — ALBUTEROL SULFATE 0.83 MG/ML
2.5 SOLUTION RESPIRATORY (INHALATION) ONCE AS NEEDED
Status: DISCONTINUED | OUTPATIENT
Start: 2024-10-30 | End: 2024-10-30 | Stop reason: HOSPADM

## 2024-10-30 RX ORDER — TIZANIDINE HYDROCHLORIDE 4 MG/1
4 CAPSULE, GELATIN COATED ORAL 3 TIMES DAILY PRN
Qty: 21 CAPSULE | Refills: 0 | Status: SHIPPED | OUTPATIENT
Start: 2024-10-30 | End: 2024-11-06

## 2024-10-30 RX ORDER — ESMOLOL HYDROCHLORIDE 10 MG/ML
INJECTION INTRAVENOUS AS NEEDED
Status: DISCONTINUED | OUTPATIENT
Start: 2024-10-30 | End: 2024-10-30

## 2024-10-30 RX ORDER — HYDROMORPHONE HYDROCHLORIDE 0.2 MG/ML
0.2 INJECTION INTRAMUSCULAR; INTRAVENOUS; SUBCUTANEOUS EVERY 5 MIN PRN
Status: DISCONTINUED | OUTPATIENT
Start: 2024-10-30 | End: 2024-10-30 | Stop reason: HOSPADM

## 2024-10-30 RX ORDER — CEFAZOLIN 1 G/1
INJECTION, POWDER, FOR SOLUTION INTRAVENOUS AS NEEDED
Status: DISCONTINUED | OUTPATIENT
Start: 2024-10-30 | End: 2024-10-30

## 2024-10-30 RX ORDER — LIDOCAINE HYDROCHLORIDE 10 MG/ML
0.1 INJECTION, SOLUTION INFILTRATION; PERINEURAL ONCE
Status: DISCONTINUED | OUTPATIENT
Start: 2024-10-30 | End: 2024-10-30 | Stop reason: HOSPADM

## 2024-10-30 RX ORDER — FENTANYL CITRATE 50 UG/ML
INJECTION, SOLUTION INTRAMUSCULAR; INTRAVENOUS AS NEEDED
Status: DISCONTINUED | OUTPATIENT
Start: 2024-10-30 | End: 2024-10-30

## 2024-10-30 RX ORDER — PROPOFOL 10 MG/ML
INJECTION, EMULSION INTRAVENOUS AS NEEDED
Status: DISCONTINUED | OUTPATIENT
Start: 2024-10-30 | End: 2024-10-30

## 2024-10-30 RX ORDER — DOCUSATE SODIUM 100 MG/1
100 CAPSULE, LIQUID FILLED ORAL 2 TIMES DAILY
Qty: 14 CAPSULE | Refills: 0 | Status: SHIPPED | OUTPATIENT
Start: 2024-10-30

## 2024-10-30 RX ORDER — PHENYLEPHRINE HCL IN 0.9% NACL 1 MG/10 ML
SYRINGE (ML) INTRAVENOUS AS NEEDED
Status: DISCONTINUED | OUTPATIENT
Start: 2024-10-30 | End: 2024-10-30

## 2024-10-30 RX ORDER — OXYCODONE HYDROCHLORIDE 5 MG/1
5 TABLET ORAL EVERY 6 HOURS PRN
Qty: 24 TABLET | Refills: 0 | Status: SHIPPED | OUTPATIENT
Start: 2024-10-30 | End: 2024-10-30 | Stop reason: HOSPADM

## 2024-10-30 RX ORDER — ONDANSETRON 4 MG/1
4 TABLET, FILM COATED ORAL EVERY 6 HOURS PRN
Qty: 20 TABLET | Refills: 0 | Status: SHIPPED | OUTPATIENT
Start: 2024-10-30 | End: 2024-11-06

## 2024-10-30 RX ORDER — ONDANSETRON HYDROCHLORIDE 2 MG/ML
4 INJECTION, SOLUTION INTRAVENOUS ONCE AS NEEDED
Status: DISCONTINUED | OUTPATIENT
Start: 2024-10-30 | End: 2024-10-30 | Stop reason: HOSPADM

## 2024-10-30 RX ORDER — SODIUM CHLORIDE, SODIUM LACTATE, POTASSIUM CHLORIDE, CALCIUM CHLORIDE 600; 310; 30; 20 MG/100ML; MG/100ML; MG/100ML; MG/100ML
100 INJECTION, SOLUTION INTRAVENOUS CONTINUOUS
Status: DISCONTINUED | OUTPATIENT
Start: 2024-10-30 | End: 2024-10-30 | Stop reason: HOSPADM

## 2024-10-30 RX ORDER — MIDAZOLAM HYDROCHLORIDE 1 MG/ML
INJECTION, SOLUTION INTRAMUSCULAR; INTRAVENOUS AS NEEDED
Status: DISCONTINUED | OUTPATIENT
Start: 2024-10-30 | End: 2024-10-30

## 2024-10-30 RX ORDER — KETOROLAC TROMETHAMINE 30 MG/ML
INJECTION, SOLUTION INTRAMUSCULAR; INTRAVENOUS AS NEEDED
Status: DISCONTINUED | OUTPATIENT
Start: 2024-10-30 | End: 2024-10-30

## 2024-10-30 RX ORDER — ACETAMINOPHEN 325 MG/1
650 TABLET ORAL EVERY 6 HOURS PRN
Qty: 20 TABLET | Refills: 0 | Status: SHIPPED | OUTPATIENT
Start: 2024-10-30 | End: 2024-11-09

## 2024-10-30 RX ORDER — HYDROMORPHONE HYDROCHLORIDE 0.2 MG/ML
0.1 INJECTION INTRAMUSCULAR; INTRAVENOUS; SUBCUTANEOUS EVERY 5 MIN PRN
Status: DISCONTINUED | OUTPATIENT
Start: 2024-10-30 | End: 2024-10-30 | Stop reason: HOSPADM

## 2024-10-30 RX ORDER — ROCURONIUM BROMIDE 10 MG/ML
INJECTION, SOLUTION INTRAVENOUS AS NEEDED
Status: DISCONTINUED | OUTPATIENT
Start: 2024-10-30 | End: 2024-10-30

## 2024-10-30 RX ORDER — CEPHALEXIN 500 MG/1
500 CAPSULE ORAL 4 TIMES DAILY
Qty: 40 CAPSULE | Refills: 0 | Status: SHIPPED | OUTPATIENT
Start: 2024-10-30 | End: 2024-11-09

## 2024-10-30 RX ORDER — CEFAZOLIN SODIUM 2 G/100ML
2 INJECTION, SOLUTION INTRAVENOUS EVERY 8 HOURS
Status: DISCONTINUED | OUTPATIENT
Start: 2024-10-30 | End: 2024-10-30 | Stop reason: HOSPADM

## 2024-10-30 RX ORDER — HYDRALAZINE HYDROCHLORIDE 20 MG/ML
5 INJECTION INTRAMUSCULAR; INTRAVENOUS EVERY 30 MIN PRN
Status: DISCONTINUED | OUTPATIENT
Start: 2024-10-30 | End: 2024-10-30 | Stop reason: HOSPADM

## 2024-10-30 RX ORDER — LIDOCAINE HYDROCHLORIDE 10 MG/ML
INJECTION, SOLUTION INFILTRATION; PERINEURAL AS NEEDED
Status: DISCONTINUED | OUTPATIENT
Start: 2024-10-30 | End: 2024-10-30

## 2024-10-30 RX ORDER — OXYCODONE HYDROCHLORIDE 5 MG/1
5 TABLET ORAL EVERY 6 HOURS PRN
Qty: 15 TABLET | Refills: 0 | Status: SHIPPED | OUTPATIENT
Start: 2024-10-30

## 2024-10-30 RX ORDER — HYDROMORPHONE HYDROCHLORIDE 2 MG/ML
INJECTION, SOLUTION INTRAMUSCULAR; INTRAVENOUS; SUBCUTANEOUS AS NEEDED
Status: DISCONTINUED | OUTPATIENT
Start: 2024-10-30 | End: 2024-10-30

## 2024-10-30 RX ORDER — ONDANSETRON HYDROCHLORIDE 2 MG/ML
INJECTION, SOLUTION INTRAVENOUS AS NEEDED
Status: DISCONTINUED | OUTPATIENT
Start: 2024-10-30 | End: 2024-10-30

## 2024-10-30 RX ORDER — IBUPROFEN 600 MG/1
600 TABLET ORAL EVERY 6 HOURS PRN
Qty: 20 TABLET | Refills: 0 | Status: SHIPPED | OUTPATIENT
Start: 2024-10-30 | End: 2024-11-09

## 2024-10-30 SDOH — HEALTH STABILITY: MENTAL HEALTH: CURRENT SMOKER: 0

## 2024-10-30 ASSESSMENT — PAIN - FUNCTIONAL ASSESSMENT
PAIN_FUNCTIONAL_ASSESSMENT: 0-10
PAIN_FUNCTIONAL_ASSESSMENT: FLACC (FACE, LEGS, ACTIVITY, CRY, CONSOLABILITY)
PAIN_FUNCTIONAL_ASSESSMENT: 0-10
PAIN_FUNCTIONAL_ASSESSMENT: 0-10

## 2024-10-30 ASSESSMENT — PAIN SCALES - GENERAL
PAINLEVEL_OUTOF10: 0 - NO PAIN

## 2024-10-30 ASSESSMENT — COLUMBIA-SUICIDE SEVERITY RATING SCALE - C-SSRS
6. HAVE YOU EVER DONE ANYTHING, STARTED TO DO ANYTHING, OR PREPARED TO DO ANYTHING TO END YOUR LIFE?: NO
2. HAVE YOU ACTUALLY HAD ANY THOUGHTS OF KILLING YOURSELF?: NO
1. IN THE PAST MONTH, HAVE YOU WISHED YOU WERE DEAD OR WISHED YOU COULD GO TO SLEEP AND NOT WAKE UP?: NO

## 2024-10-30 NOTE — BRIEF OP NOTE
Date: 10/30/2024  OR Location: [facility]    Name: Adrianna Quinones, : 1965, Age: 58 y.o., MRN: [id number], Sex: female    Diagnosis  Pre-op Diagnosis      * History of right breast cancer [Z85.3] Post-op Diagnosis     * History of right breast cancer [Z85.3]     Procedures  Breast Reconstruction Revision w/Implant, Pocket Revision, Possible use of Acellular Dermal Matrix, Possible Nipple Reconstruction Revision, Fat Transfer from Abdomen to Breasts  42496 - [place] GRAFTING OF AUTOLOGOUS FAT BY LIPO 50 CC OR LESS    Breast Reconstruction Revision w/Implant, Pocket Revision, Possible use of Acellular Dermal Matrix, Possible Nipple Reconstruction Revision, Fat Transfer from Abdomen to Breasts  62492 - [place] GRAFTING OF AUTOLOGOUS FAT BY LIPO EA ADDL 50 CC    Breast Reconstruction Revision w/Implant, Pocket Revision, Possible use of Acellular Dermal Matrix, Possible Nipple Reconstruction Revision, Fat Transfer from Abdomen to Breasts  96401 - [place] INSJ/RPLCMT BREAST IMPLANT SEP DAY MASTECTOMY    Breast Reconstruction Revision w/Implant, Pocket Revision, Possible use of Acellular Dermal Matrix, Possible Nipple Reconstruction Revision, Fat Transfer from Abdomen to Breasts  46604 - [place] REVISION OF RECONSTRUCTED BREAST      Surgeons      * Lorrie Laguna - Primary    Resident/Fellow/Other Assistant:  Surgeons and Role:  * No surgeons found with a matching role *    Staff:   Gideonulator: Salud Gómez Person: Nina  Circulator: Neil    Anesthesia Staff: Anesthesiologist: Mohsen Guardado MD  CRNA: Vidhi Medeiros APRN-CRNA  C-AA: JOLYNN Mccollum  SRNA: Tod Pelletier    Procedure Summary  Anesthesia: General  ASA: II  Estimated Blood Loss: 20mL  Intra-op Medications:   Administrations occurring from 1215 to 1445 on 10/30/24:   Medication Name Total Dose   EPINEPHrine (Adrenalin) 1 mg, lidocaine (Xylocaine) 30 mL in lactated Ringer's 1,000 mL irrigation 1,031 mL   ceFAZolin (Ancef) 1,000 mg, gentamicin (Garamycin) 80  mg in sodium chloride 0.9 % 500 mL irrigation 500 mL   ceFAZolin (Ancef) 1 g 2 g   dexAMETHasone (Decadron) 4 mg/mL 8 mg   esmolol 10 mg/mL 30 mg   fentaNYL PF 0.05 mg/mL 100 mcg   HYDROmorphone (Dilaudid) 2 mg/mL vial 1 mg   LR bolus Cannot be calculated   lidocaine (Xylocaine) 1 % 50 mg   midazolam (Versed) 1 mg/1 mL 2 mg   phenylephrine 100 mcg/mL syringe 10 mL (prefilled) 300 mcg   propofol (Diprivan) injection 10 mg/mL 200 mg   rocuronium (ZeMuron) 50 mg/5 mL injection 80 mg              Anesthesia Record               Intraprocedure I/O Totals          Intake    LR bolus 900.00 mL    Total Intake 900 mL       Output    Urine 75 mL    Total Output 75 mL       Net    Net Volume 825 mL          Specimen: None    Wetting solution: 1,000 mL LR lidocaine 30 ml 1% epinephrine 1 mg  Implanted: BL allergan smooth round responsive gel  Right:  [id number]  Left:  [id number]  Explanted: bilateral allergan smooth round 300 ml style 10 (intact) silicone implants Lot [id number] left; [id number] right ruptured upon remomval without gross contamination of the pocket    Findings: lateral pocket migration on left with constricted superior pocket required capsulotomy and pectoralis elevation. Thin capsules without abnormal tissue.    Complications:  None; patient tolerated the procedure well.     Disposition: PACU - hemodynamically stable.  Condition: stable  Specimens Collected: No specimens collected  Attending Attestation: I was present and scrubbed for the entire procedure.  -  Lorrie Laguna  Phone Number: [phone]

## 2024-10-30 NOTE — POST-PROCEDURE NOTE
1705- pt brought back from pacu,verbal report received. Pt is alert and awake. Snack and drink given. Rx to go at bedside.  brought back from waiting room.    1745- vss. Dc instructions given and explained to pt and . Both verbally understood. Pt tolerating snack and drink.    1755- pt getting dressed at this time with assistance of .    1804- transport at bedside for dc.

## 2024-11-04 NOTE — OP NOTE
Breast Reconstruction Revision w/Implant, Pocket Revision, Possible use of Acellular Dermal Matrix, Possible Nipple Reconstruction Revision, Fat Transfer from Abdomen to Breasts (B) Operative Note     Date: 10/30/2024  OR Location: [facility]    Name: Adrianna Quinones, : 1965, Age: 58 y.o., MRN: [id number], Sex: female    Diagnosis  Pre-op Diagnosis      * History of right breast cancer [Z85.3] Post-op Diagnosis     * History of right breast cancer [Z85.3]     Procedures  Breast Reconstruction Revision w/Implant, Pocket Revision, Possible use of Acellular Dermal Matrix, Possible Nipple Reconstruction Revision, Fat Transfer from Abdomen to Breasts  97247 - [place] GRAFTING OF AUTOLOGOUS FAT BY LIPO 50 CC OR LESS    Breast Reconstruction Revision w/Implant, Pocket Revision, Possible use of Acellular Dermal Matrix, Possible Nipple Reconstruction Revision, Fat Transfer from Abdomen to Breasts  35589 - [place] GRAFTING OF AUTOLOGOUS FAT BY LIPO EA ADDL 50 CC    Breast Reconstruction Revision w/Implant, Pocket Revision, Possible use of Acellular Dermal Matrix, Possible Nipple Reconstruction Revision, Fat Transfer from Abdomen to Breasts  33511 - [place] INSJ/RPLCMT BREAST IMPLANT SEP DAY MASTECTOMY    Breast Reconstruction Revision w/Implant, Pocket Revision, Possible use of Acellular Dermal Matrix, Possible Nipple Reconstruction Revision, Fat Transfer from Abdomen to Breasts  59996 - [place] REVISION OF RECONSTRUCTED BREAST      Surgeons      * Lorrie Laguna - Primary    Resident/Fellow/Other Assistant:  Chevy FARIA    Staff:   Circulator: Salud Gómez Person: Nina  Circulator: Neil    Anesthesia Staff: Anesthesiologist: Mohsen Guardado MD  CRNA: HUY Ordoñez-CRNA  C-AA: JOLYNN Mccollum  SRNA: Tod Pelletier    Procedure Summary  Anesthesia: General  ASA: II  Estimated Blood Loss: 20 mL  Intra-op Medications:   Administrations occurring from 1215 to 1445 on 10/30/24:   Medication Name Total Dose    EPINEPHrine (Adrenalin) 1 mg, lidocaine (Xylocaine) 30 mL in lactated Ringer's 1,000 mL irrigation 1,031 mL   ceFAZolin (Ancef) 1,000 mg, gentamicin (Garamycin) 80 mg in sodium chloride 0.9 % 500 mL irrigation 500 mL   ceFAZolin (Ancef) 1 g 2 g   dexAMETHasone (Decadron) 4 mg/mL 8 mg   esmolol 10 mg/mL 30 mg   fentaNYL PF 0.05 mg/mL 100 mcg   HYDROmorphone (Dilaudid) 2 mg/mL vial 1 mg   LR bolus Cannot be calculated   lidocaine (Xylocaine) 1 % 50 mg   midazolam (Versed) 1 mg/1 mL 2 mg   phenylephrine 100 mcg/mL syringe 10 mL (prefilled) 300 mcg   propofol (Diprivan) injection 10 mg/mL 200 mg   rocuronium (ZeMuron) 50 mg/5 mL injection 80 mg              Anesthesia Record               Intraprocedure I/O Totals          Intake    LR bolus 900.00 mL    Total Intake 900 mL       Output    Urine 75 mL    Total Output 75 mL       Net    Net Volume 825 mL          Specimen: No specimens collected     Wetting solution: 1,000 mL LR lidocaine 30 ml 1% epinephrine 1 mg   Total lipoaspirate: 650 ml  Fat (Revolve):  180 ml  Fat (oil in lipoaspirate): 150 ml    Implants:  Implants       Type Name Action Serial No.      Breast Implant BREAST SIZER, SALINE, SMOOTH ROUND MOD PLUS, 450CC - [id number] Implanted       445CC VOLUME, 13.50CM SAYRA, 4.5CM PROJECTION, ALLERGAN NATRELLE INSPIRA SMOOTH ROUNG GEL BREAST IMPLANT, MODERATE PROFILE Implanted [id number]      415CC VOLUME, 12.50CM SAYRA, 5.1CM PROJECTION, ALLERGAN NATRELLE INSPIRA SMOOTH ROUNG GEL BREAST IMPLANT, FULL PROFILE Implanted [id number]     Breast Implant BREAST SIZER, SALINE, SMOOTH ROUND MOD PLUS, 450CC - [id number] Implanted           Explanted: bilateral allergan smooth round 300 ml style 10 (intact) silicone implants Lot [id number] left; [id number] right ruptured upon remomval without gross contamination of the pocket       Findings: lateral pocket migration on left with constricted superior pocket required capsulotomy and pectoralis elevation. Thin capsules without abnormal  tissue.       Indications: Adrianna Quinones is an 58 y.o. female who is having surgery for History of right breast cancer [Z85.3] with disproportion and deformity of breast reconstruction.    The patient was seen in the preoperative area. The risks, benefits, complications, treatment options, non-operative alternatives, expected recovery and outcomes were discussed with the patient. The possibilities of reaction to medication, pulmonary aspiration, injury to surrounding structures, bleeding, recurrent infection, the need for additional procedures, failure to diagnose a condition, and creating a complication requiring transfusion or operation were discussed with the patient. The patient concurred with the proposed plan, giving informed consent.  The site of surgery was properly noted/marked if necessary per policy. The patient has been actively warmed in preoperative area. Preoperative antibiotics have been ordered and given within 1 hours of incision. Venous thrombosis prophylaxis have been ordered including bilateral sequential compression devices    Procedure Details:   Adrianna Quinones Was identified in the preoperative area.  Informed consent was reviewed, questions answered and markings made.  The patient was then brought to the operating room where timeout was completed successfully.  She was transferred to the operating room table, where general anesthesia was induced.  With airway confirmed, Le catheter was placed.  Extremities were adjusted and padded.  The chest and abdomen were then sterilely prepped and draped.  Additional timeout was completed.    Wetting solution consisting of dilute epinephrine and lidocaine was infiltrated throughout the upper abdomen through access incisions in the inframammary folds.  The periumbilical area was strictly avoided due to known umbilical hernia.  This was allowed to dwell for maximal vasoconstrictive effect.    Starting on the left breast, the lateral portion of the  prior mastectomy scar was incised sharply with #15 blade.  A stairstep incision was then carried through the thin layer of subcutaneous fat to the implant capsule which was opened with cautery.  The breast implant was removed, and during removal the implant ruptured.  No silicone spilled into the cavity, it was all ruptured outside of the breast implant cavity.  Staff members handling the implant changed gloves.  The implant removed was a smooth round 300 cc silicone implant.  The pocket was inspected.  The pocket was found to be constricted superiorly medially and required opening capsulotomies using Bovie cautery.  Superiorly, the pectoralis muscle required elevation from the chest wall and pectoralis minor for approximately 2-1/2 cm cranially to create an adequate implant pocket to promote symmetry between the breast.    Attention was then turned to removal of the right breast implant.  A similar lateral incision was created and pre-existing scar with stairstep approach to the implant capsule through exceedingly thin subcutaneous tissue.  The breast implant removed was removed intact and was found to be a 300 cc smooth round gel implant.  The implant pocket was inspected, and found to be severely constricted inferiorly.  Medial and inferior capsulotomies were created.  A crosshatch pattern of capsulotomy was created along the entire lower pole of the breast implant capsule to allow for expansion of the skin and tissue.  Hemostasis was meticulously ensured.  Breast pockets were irrigated, and sizers were placed.  Sizers were inflated to 300, 350, 400, 450 cc.  Good symmetry was achieved with left breast sizer inflated to 450 cc, and right sizer inflated to 415 cc.  Breast implants were chosen accordingly, to match both base diameter and volume.    The patient was reclined, sizers were removed.  Hemostasis was again ensured.  Bilateral breast implant pockets were thoroughly irrigated.    Attention was then turned  to liposuction of the abdomen And lateral chest.  The upper abdomen was accessed using a 4 mm Mercedes tip cannula and fat was harvested on 85% power assistance in line in the revolve.  Fat was removed until approximately 250 to 300 cc of fat was in the collection canister.  Even removal was then confirmed with rolling pinch test and manual palpation.  The fat was then washed according to  recommendations with warmed LR.  It was then transferred to 10 cc syringes for injection.    Breast implant sizers were replaced, the patient was sat up and fat was injected into bilateral breasts and 10 cc aliquots using Stuart cannula system.  Fat was injected preferentially over the left superior breast, as well as the right inferior lateral breast.  80 cc were injected into the left breast, and 100 cc to the right breast.  Slight overfilling was performed.    Patient was then reclined, sizers were removed.  Pockets were again inspected for hemostasis.  There were irrigated with saline, followed by sterile dilute Betadine prep solution and Irrisept.  The chest was then reprepped with Betadine, clean towels were placed.  All staff changed gloves.  The implants were then opened sterilely onto the field.  Using no touch technique, implants were transferred to a James funnel and then placed in the prepared breast implant pockets.  The capsules were closed in a watertight fashion with 2-0 Vicryl.  The deep dermis was then approximated using 3-0 Vicryl interrupted sutures, and closed with a running 3-0 Monocryl subcuticular.    All counts were correct prior to closure.  The patient was sat up and good symmetry was noted between the breast.  Dressings were applied.  Compression garment and bra were then applied.  The patient emerged from anesthesia, airway was removed.  She was transferred to the stretcher then to recovery in good condition.  There were no immediate complications.    Complications:  None; patient  tolerated the procedure well.    Disposition: PACU - hemodynamically stable.  Condition: stable       Attending Attestation: I was present and scrubbed for the entire procedure. A qualified resident physician was not available.    Lorrie Laguna

## 2025-06-13 ENCOUNTER — TELEPHONE (OUTPATIENT)
Dept: PRIMARY CARE | Facility: CLINIC | Age: 60
End: 2025-06-13
Payer: COMMERCIAL

## 2025-06-13 ENCOUNTER — TELEMEDICINE (OUTPATIENT)
Dept: PRIMARY CARE | Facility: CLINIC | Age: 60
End: 2025-06-13
Payer: COMMERCIAL

## 2025-06-13 DIAGNOSIS — N12 PYELONEPHRITIS: Primary | ICD-10-CM

## 2025-06-13 PROCEDURE — 1036F TOBACCO NON-USER: CPT | Performed by: FAMILY MEDICINE

## 2025-06-13 PROCEDURE — 99214 OFFICE O/P EST MOD 30 MIN: CPT | Performed by: FAMILY MEDICINE

## 2025-06-13 RX ORDER — TRAMADOL HYDROCHLORIDE 50 MG/1
50 TABLET, FILM COATED ORAL EVERY 6 HOURS PRN
Qty: 6 TABLET | Refills: 0 | Status: SHIPPED | OUTPATIENT
Start: 2025-06-13 | End: 2025-06-15

## 2025-06-13 RX ORDER — CIPROFLOXACIN 500 MG/1
500 TABLET, FILM COATED ORAL 2 TIMES DAILY
Qty: 14 TABLET | Refills: 0 | Status: SHIPPED | OUTPATIENT
Start: 2025-06-13 | End: 2025-06-20

## 2025-06-13 ASSESSMENT — ENCOUNTER SYMPTOMS
NAUSEA: 0
VOMITING: 0
ABDOMINAL PAIN: 1
CHILLS: 1
FEVER: 0
BACK PAIN: 1
DIARRHEA: 0

## 2025-06-13 NOTE — PROGRESS NOTES
Subjective   Patient ID: Adrianna Quinones is a 59 y.o. female who presents for Urinary Problem.    Patient consents to this telemedicine appointment and acknowledges its limitations.    I performed this visit using real-time telehealth tools, including an audio/video connection between Adrianna Quinones at [place], [place] and Keesha Gaston DO located at  [facility] in [place], [place].    Length of telemedicine visit: 16:52 minutes      Adrianna started having pain on Tuesday. Symptoms felt like a UTI. Felt like had to urinate more frequently. She then suddenly developed pain on her right side. Pain was so severe that went to ER. CT scan which showed swelling in right kidney. Urine testing showed blood and white blood cells. Was told that she possibly passed a kidney stone or had an infection. Was prescribed pain medication, nitrofurantoin and Flomax. Went home and ate. She then began vomiting significantly. Pain returned. She went back to the ER and was told to continue current plan.    Her symptoms have not improved. Over the past few days, she has continued to have pain. Most of pain on right flank area. Also feels like bladder spasms. Urinating more frequently. Took last dose of nitrofurantoin. Has not had any fevers but feels chilled. She is still experiencing significant pain that has not improved with diclofenac.     Of note, she was treated for a UTI on 4/24. Symptoms did improve after treatment.    Back Pain  This is a new problem. The current episode started in the past 7 days. The problem occurs 2 to 4 times per day. The problem has been waxing and waning since onset. The pain is present in the lumbar spine. The quality of the pain is described as aching and stabbing. The pain does not radiate. The pain is at a severity of 6/10. The pain is The same all the time. Stiffness is present All day. Associated symptoms include abdominal pain. Pertinent negatives include no fever. Risk  factors include history of cancer and menopause.        Review of Systems   Constitutional:  Positive for chills. Negative for fever.   Gastrointestinal:  Positive for abdominal pain. Negative for diarrhea, nausea and vomiting.   Musculoskeletal:  Positive for back pain.       Objective   LMP 08/01/2011     Physical Exam  Constitutional:       General: She is not in acute distress.     Appearance: Normal appearance.   HENT:      Head: Normocephalic.   Pulmonary:      Effort: Pulmonary effort is normal.   Neurological:      General: No focal deficit present.      Mental Status: She is alert.   Psychiatric:         Mood and Affect: Mood normal.         Assessment/Plan   Problem List Items Addressed This Visit           ICD-10-CM    Pyelonephritis - Primary N12    Reviewed CT and labs completed in ER. Symptoms persistent despite completing course of Macrobid. Start ciprofloxacin x7 days. Due to severity of pain not controlled by diclofenac, start tramadol. ER precautions discussed.          Relevant Medications    ciprofloxacin (Cipro) 500 mg tablet    traMADol (Ultram) 50 mg tablet

## 2025-06-13 NOTE — TELEPHONE ENCOUNTER
Pt called stating that [place] was [place] and developed a kidney stone.   She went to the ER, but there is still pain she having.  She wanted an OV with Dr. CLAYTON but her soonest is not until 06/23.  Please advise what can be done.
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
## 2025-06-13 NOTE — TELEPHONE ENCOUNTER
Scheduled for 6/16 at 8:30. Pt was seen at [facility]- records printed and up to [facility] for review. Thanks! CG

## 2025-06-13 NOTE — TELEPHONE ENCOUNTER
Physical Therapy Daily Treatment     Visit Count: 11  Plan of Care Dates: Initial: 9/8/17 Through: 11/17/17  Insurance Information: 2017 medicare benefits kx modifier on the 15th visit from January 1st.  $0 used on [employer]  No prior visits  9/7 brit   Next Referring Provider Visit: 10/16/17       Referred by: Ozzie Holliday DO  Medical Diagnosis (from order): M17.11 Primary osteoarthritis of right knee   Insurance: 1. MEDICARE  2. AARP    Date of Onset: right total knee arthroplasty performed 9/5/17  Diagnosis Precautions: none  Chart reviewed: Relevant co-morbidities, allergies, tests and medications: high blood pressure-not taking medication for it currently    SUBJECTIVE   Reports he is feeling alight. He reports he is getting more feeling back around his knee. Still having a hard time sleeping on his side.     Current Pain: 5/10.      Functional Change: difficulty putting socks and shoes on     OBJECTIVE      Range of Motion (degrees) Norm Right  AAROM Right   AROM/AAROM Right  AROM/AAROM Right   Date   9/11/17 9/18/17 9/25/2017 9/29/2017    Knee Flexion 135  110 108/113 115/117  121 AROM   Knee Extension 0-5 -3 -1,0 0  0   standard testing positions unless otherwise noted; Key: ranges are reported in active range of motion unless noted as AA=active assistive or P=passive range of motion, * denotes pain   Comments:       Treatment   Therapeutic Exercise:   Scifit bike seat 9 x 7 min   Stair knee flexion stretch with 10 second hold x 10  Standing hamstring stretch 2 x 30 seconds bilateral    Anterior step ups on 6 inch step x 10  R  Lateral step ups on 6 inch step x 10 R  Anterior step downs 6 inch step x 10 R  Ascending and descending 4 steps reciprocally x 2 set 1 rail   Walking high knees for knee flexion 2 x 50 feet   Ambulated 2 x 50 feet without AD - decreased stance time on R   Quad sets with 5 second hold x 10   SLR x 10   LAQ x 10 with 5 second hold   Heel slides x 10 AROM with gait  Received records. No kidney stone was seen on her imaging but there was swelling around the kidney that suggested possible recent passage of kidney stone or infection. If she is still having significant pain, please put on my lunch today. If overall pain improved, can see Monday as planned.    belt      Manual Therapy:  None today     Therapeutic Activity:  None today      Current Home Program (not performed this date except as noted above): 9/8/2017: continue exercises from the hospital, add heel prop to quad sets   9/11/17:  Issued gait belt for heel slides throughout the day.  Instructed pt to perform marching seated and step overs to help strengthen LE to assist with lifting.  9/13/2017: icing 20 minutes on, 20 minutes off throughout the day. Recommended he ice when he gets home from therapy     ASSESSMENT   Patient tolerated therapy session well. Patient was able to achieve 121 degrees of active knee flexion this session. Patient did well with stair negotiation this session. Patient challenged with ambulation without AD this session. Overall patient is progressing well towards set goals.     Pain after treatment: \"tighness\"/10.      Result of above outlined education: Verbalizes understanding and Demonstrates understanding     Goals:    As of 9/29/17   To be obtained by end of this plan of care:  1. Patient independent with modified and progressed home exercise program. Progressing  2.   Patient will increase right knee flexion AROM to 120 degrees to allow for proper toe clearance with stairs by discharge. 75% Met  3. Patient will increase right knee extension AROM to 0 degrees to allow for proper gait pattern without a limp by discharge. Patient will increase right knee strength to 4+/5 to assist with sit to stand transfers and stairs by discharge. 60% Met  4. Patient will demonstrate 8 stairs with reciprocal pattern by discharge. Not Met (performs non-reciprocally)  5.   Patient will ambulate on even and uneven surfaces without any assistive device by discharge. 50% Met  6.  Patient will report <2/10 pain with community ambulation and household chores by discharge. 50% Met  7. Lower Extremity Functional Scale: Patient will complete form to reflect an improved score from initial score of 13 to  greater than or equal to 50 (0=extreme difficulty; 80=no difficulty) to indicate pt reported improvement in function/disability/impairment (minimal detectable change: 9 points). 50% Met, 36/80 9/29/17       PLAN   Quad strengthening, step ups/downs, gait training      THERAPY DAILY BILLING   Primary Insurance: MEDICARE  Secondary Insurance: [facility]    Evaluation Procedures:  No evaluation codes were used on this date of service    Timed Procedures:  Therapeutic Exercise, 45 minutes    Untimed Procedures:  No untimed codes were used on this date of service    Total Treatment Time: 45 minutes    G-Code:  G-Code Score ABN form  reporting not required this treatment session  Modifier based on outcome measure(s)/functional testing/clinical judgement as listed above    The referring provider's electronic or written signature on the evaluation authorizes the therapy plan of care and certifies the need for these services, furnished under this plan of care while under their care.  Physician Signature on file.

## 2025-06-16 ENCOUNTER — APPOINTMENT (OUTPATIENT)
Dept: PRIMARY CARE | Facility: CLINIC | Age: 60
End: 2025-06-16
Payer: COMMERCIAL

## 2025-07-01 ENCOUNTER — APPOINTMENT (OUTPATIENT)
Dept: UROLOGY | Facility: CLINIC | Age: 60
End: 2025-07-01
Payer: COMMERCIAL

## 2025-07-01 DIAGNOSIS — N12 PYELONEPHRITIS: Primary | ICD-10-CM

## 2025-07-01 DIAGNOSIS — N32.89 BLADDER SPASMS: ICD-10-CM

## 2025-07-01 DIAGNOSIS — R10.9 FLANK PAIN: ICD-10-CM

## 2025-07-01 DIAGNOSIS — N13.39 OTHER HYDRONEPHROSIS: ICD-10-CM

## 2025-07-01 LAB
POC APPEARANCE, URINE: CLEAR
POC BILIRUBIN, URINE: NEGATIVE
POC BLOOD, URINE: ABNORMAL
POC COLOR, URINE: YELLOW
POC GLUCOSE, URINE: NEGATIVE MG/DL
POC KETONES, URINE: NEGATIVE MG/DL
POC LEUKOCYTES, URINE: ABNORMAL
POC NITRITE,URINE: NEGATIVE
POC PH, URINE: 6 PH
POC PROTEIN, URINE: NEGATIVE MG/DL
POC SPECIFIC GRAVITY, URINE: <=1.005
POC UROBILINOGEN, URINE: 0.2 EU/DL

## 2025-07-01 PROCEDURE — 99204 OFFICE O/P NEW MOD 45 MIN: CPT | Performed by: UROLOGY

## 2025-07-01 PROCEDURE — 81003 URINALYSIS AUTO W/O SCOPE: CPT | Performed by: UROLOGY

## 2025-07-01 PROCEDURE — 1036F TOBACCO NON-USER: CPT | Performed by: UROLOGY

## 2025-07-01 RX ORDER — KETOROLAC TROMETHAMINE 10 MG/1
10 TABLET, FILM COATED ORAL EVERY 6 HOURS PRN
Qty: 20 TABLET | Refills: 0 | Status: SHIPPED | OUTPATIENT
Start: 2025-07-01

## 2025-07-01 RX ORDER — KETOROLAC TROMETHAMINE 10 MG/1
10 TABLET, FILM COATED ORAL EVERY 6 HOURS PRN
Qty: 20 TABLET | Refills: 0 | Status: SHIPPED | OUTPATIENT
Start: 2025-07-01 | End: 2025-07-01

## 2025-07-01 RX ORDER — PHENAZOPYRIDINE HYDROCHLORIDE 200 MG/1
200 TABLET, FILM COATED ORAL 3 TIMES DAILY
Qty: 30 TABLET | Refills: 0 | Status: SHIPPED | OUTPATIENT
Start: 2025-07-01 | End: 2025-07-11

## 2025-07-01 NOTE — PROGRESS NOTES
Subjective   Adrianna Quinones is a 59 y.o. female presenting as a new patient today due to nephrolithiasis. Patient presented to the ER on 6/10/2025 with complaints of right flank pain.  CT A&P was obtained which showed fullness of the right renal collecting system without obvious obstructing calculus, likely due to recently passed stone. Patient reports flank pain has resolved. She has severe bladder spasms and was started on oxybutynin with no response. Denies any recent gross hematuria, fevers, chills, urinary retention, nausea or vomiting.              Medical History[1]  Surgical History[2]  Family History[3]  Current Medications[4]  Allergies[5]  Social History     Socioeconomic History    Marital status:      Spouse name: Not on file    Number of children: Not on file    Years of education: Not on file    Highest education level: Not on file   Occupational History    Not on file   Tobacco Use    Smoking status: Former     Current packs/day: 0.00     Types: Cigarettes     Start date: 1985     Quit date: 1987     Years since quittin.5    Smokeless tobacco: Never   Vaping Use    Vaping status: Never Used   Substance and Sexual Activity    Alcohol use: Yes     Alcohol/week: 3.0 standard drinks of alcohol     Types: 3 Standard drinks or equivalent per week     Comment: occasional    Drug use: Never    Sexual activity: Yes     Partners: Male   Other Topics Concern    Not on file   Social History Narrative    Not on file     Social Drivers of Health     Financial Resource Strain: Not on file   Food Insecurity: Not on file   Transportation Needs: Not on file   Physical Activity: Not on file   Stress: Not on file   Social Connections: Not on file   Intimate Partner Violence: Not on file   Housing Stability: Not on file       Review of Systems  Pertinent items are noted in HPI.    Objective       Lab Review  Lab Results   Component Value Date    WBC 4.8 10/28/2024    RBC 4.47 10/28/2024    HGB 13.1  10/28/2024    HCT 40.6 10/28/2024     10/28/2024      Lab Results   Component Value Date    BUN 19 10/14/2024    CREATININE 0.75 10/14/2024      Urine analysis shows +leukocytes, +blood         Assessment/Plan   Diagnoses and all orders for this visit:  Pyelonephritis  -     POCT UA Automated manually resulted  -     Urine Culture  Other hydronephrosis  -     CT urography w 3D volume rendered imaging; Future  Flank pain  -     CT urography w 3D volume rendered imaging; Future  -     Urine Culture  Bladder spasms    Bladder spasms   Hydronephrosis     I personally and independently reviewed images of the CT A&P from 6/10/2025 which showed fullness of the right renal collecting system without obvious obstructing calculus, likely due to recently passed stone.     We will obtain a STAT CTU to r/o a distal ureter stone.     IO UA today shows trace leukocytes and moderate blood. We will check urine culture to r/o a UTI.      Follow up virtually to review.     Rx for pyridium and Toradol given.     All questions were answered to the patient's satisfaction. Patient agrees with the plan and wishes to proceed. Follow-up will be scheduled appropriately.       Scribed for Dr. Adams by June Kelly. I , Dr Adams, have personally reviewed and agreed with the information entered by the Virtual Scribe.          [1]   Past Medical History:  Diagnosis Date    Encounter for screening for malignant neoplasm of vagina     Vaginal Pap smear    Postmenopausal bleeding 08/15/2023   [2]   Past Surgical History:  Procedure Laterality Date    BREAST BIOPSY  2011     SECTION, LOW TRANSVERSE  2005    COLONOSCOPY  2015    MASTECTOMY Bilateral 2011    OTHER SURGICAL HISTORY  2012    CA BREAST AUGMENTATION WITH IMPLANT  2012    TUBAL LIGATION  2005   [3]   Family History  Problem Relation Name Age of Onset    Pancreatic cancer Mother Glenaire Cage     Cancer Mother Glenaire Cage     Diabetes Father Jet Cage      Pancreatic cancer Mother's Sister      Cancer Sister Clau Carvalho    [4]   Current Outpatient Medications   Medication Sig Dispense Refill    aspirin 81 mg EC tablet Take 1 tablet (81 mg) by mouth once daily.      CALCIUM CARBONATE-VITAMIN D3 ORAL Take 1 tablet by mouth once daily.      cholecalciferol (Vitamin D-3) 25 MCG (1000 UT) capsule Take 1 capsule (25 mcg) by mouth once daily.      multivitamin tablet Take 1 tablet by mouth once daily.       No current facility-administered medications for this visit.   [5]   Allergies  Allergen Reactions    Sulfamethoxazole Hives     Bactrim

## 2025-07-03 ENCOUNTER — HOSPITAL ENCOUNTER (OUTPATIENT)
Dept: RADIOLOGY | Facility: CLINIC | Age: 60
Discharge: HOME | End: 2025-07-03
Payer: COMMERCIAL

## 2025-07-03 DIAGNOSIS — N13.39 OTHER HYDRONEPHROSIS: ICD-10-CM

## 2025-07-03 DIAGNOSIS — R10.9 FLANK PAIN: ICD-10-CM

## 2025-07-03 LAB — BACTERIA UR CULT: NORMAL

## 2025-07-03 PROCEDURE — 74178 CT ABD&PLV WO CNTR FLWD CNTR: CPT

## 2025-07-03 PROCEDURE — 2550000001 HC RX 255 CONTRASTS: Performed by: UROLOGY

## 2025-07-03 RX ADMIN — IOHEXOL 69 ML: 350 INJECTION, SOLUTION INTRAVENOUS at 09:50

## 2025-07-08 ENCOUNTER — APPOINTMENT (OUTPATIENT)
Dept: UROLOGY | Facility: CLINIC | Age: 60
End: 2025-07-08
Payer: COMMERCIAL

## 2025-07-08 DIAGNOSIS — N13.39 OTHER HYDRONEPHROSIS: Primary | ICD-10-CM

## 2025-07-08 PROCEDURE — 99214 OFFICE O/P EST MOD 30 MIN: CPT | Performed by: UROLOGY

## 2025-07-08 NOTE — PROGRESS NOTES
Subjective   This visit was completed via telemedicine. All issues as below were discussed and addressed but no physical exam was performed unless allowed by visual confirmation. If it was felt that the patient should be evaluated in clinic, then they were directed there. Patient verbally consented to visit.    Adrianna Quinones is a 59 y.o. female with history of nephrolithiasis, hydronephrosis and bladder spasms; presenting today to review CTU.  Patient has persistent bladder spasms, responsive to pyridium.       LAST VISIT (2025):  Adrianna Quinones is a 59 y.o. female presenting as a new patient today due to nephrolithiasis. Patient presented to the ER on 6/10/2025 with complaints of right flank pain.  CT A&P was obtained which showed fullness of the right renal collecting system without obvious obstructing calculus, likely due to recently passed stone. Patient reports flank pain has resolved. She has severe bladder spasms and was started on oxybutynin with no response. Denies any recent gross hematuria, fevers, chills, urinary retention, nausea or vomiting.        Medical History[1]  Surgical History[2]  Family History[3]  Current Medications[4]  Allergies[5]  Social History     Socioeconomic History    Marital status:      Spouse name: Not on file    Number of children: Not on file    Years of education: Not on file    Highest education level: Not on file   Occupational History    Not on file   Tobacco Use    Smoking status: Former     Current packs/day: 0.00     Types: Cigarettes     Start date: 1985     Quit date: 1987     Years since quittin.5    Smokeless tobacco: Never   Vaping Use    Vaping status: Never Used   Substance and Sexual Activity    Alcohol use: Yes     Alcohol/week: 3.0 standard drinks of alcohol     Types: 3 Standard drinks or equivalent per week     Comment: occasional    Drug use: Never    Sexual activity: Yes     Partners: Male     Birth control/protection:  Post-menopausal, None   Other Topics Concern    Not on file   Social History Narrative    Not on file     Social Drivers of Health     Financial Resource Strain: Not on file   Food Insecurity: Not on file   Transportation Needs: Not on file   Physical Activity: Not on file   Stress: Not on file   Social Connections: Not on file   Intimate Partner Violence: Not on file   Housing Stability: Not on file       Review of Systems  Pertinent items are noted in HPI.    Objective       Lab Review  Lab Results   Component Value Date    WBC 4.8 10/28/2024    RBC 4.47 10/28/2024    HGB 13.1 10/28/2024    HCT 40.6 10/28/2024     10/28/2024      Lab Results   Component Value Date    BUN 19 10/14/2024    CREATININE 0.75 10/14/2024            Assessment/Plan   There are no diagnoses linked to this encounter.    Bladder spasms   Hydronephrosis     I personally and independently reviewed images of the CTU from 7/3/2025 which showed delayed nephrogram and mild hydronephrosis and hydroureter down to the iliac crossing distal to which the ureter is not dilated. No evidence of stones.     Unclear etiology of hydronephrosis, possible ureteral stricture.     Patient denies any abdominal or pelvic surgery other than two C-sections.     We will refer her to Dr. Melgar for further evaluation.     All questions were answered to the patient's satisfaction. Patient agrees with the plan and wishes to proceed. Follow-up will be scheduled appropriately.     I spent 30 minutes of dedicated E&M time, including preparation and review of records, notes, and data, time spent with patient/family, and documentation.     I, June Kelly am scribing for, and in the presence of Dr Adams.      I, Dr Adams, personally performed the services described in the documentation as scribed by June Kelly in my presence, and confirm it is both accurate and complete.           [1]   Past Medical History:  Diagnosis Date    Cancer (Multi) 6/2011    Encounter for  screening for malignant neoplasm of vagina     Vaginal Pap smear    Kidney stone 6/10/2025    Postmenopausal bleeding 08/15/2023    Urinary tract infection 2025   [2]   Past Surgical History:  Procedure Laterality Date    BREAST BIOPSY  2011     SECTION, LOW TRANSVERSE  2005    COLONOSCOPY  2015    MASTECTOMY Bilateral 2011    OTHER SURGICAL HISTORY  2012    [place] BREAST AUGMENTATION WITH IMPLANT  2012    TUBAL LIGATION  2005   [3]   Family History  Problem Relation Name Age of Onset    Pancreatic cancer Mother Osiris Cage     Cancer Mother [place] Cage     Diabetes Father Jet Cage     Pancreatic cancer Mother's Sister      Cancer Sister Clau Carvalho    [4]   Current Outpatient Medications   Medication Sig Dispense Refill    aspirin 81 mg EC tablet Take 1 tablet (81 mg) by mouth once daily.      CALCIUM CARBONATE-VITAMIN D3 ORAL Take 1 tablet by mouth once daily.      cholecalciferol (Vitamin D-3) 25 MCG (1000 UT) capsule Take 1 capsule (25 mcg) by mouth once daily.      ketorolac (Toradol) 10 mg tablet TAKE 1 TABLET (10 MG) BY MOUTH EVERY 6 HOURS IF NEEDED FOR MODERATE PAIN (4 - 6). 20 tablet 0    multivitamin tablet Take 1 tablet by mouth once daily.      phenazopyridine (Pyridium) 200 mg tablet Take 1 tablet (200 mg) by mouth 3 times a day for 10 days. 30 tablet 0     No current facility-administered medications for this visit.   [5]   Allergies  Allergen Reactions    Sulfamethoxazole Hives     Bactrim

## 2025-07-09 ENCOUNTER — APPOINTMENT (OUTPATIENT)
Dept: UROLOGY | Facility: CLINIC | Age: 60
End: 2025-07-09
Payer: COMMERCIAL

## 2025-07-09 DIAGNOSIS — N32.89 BLADDER SPASMS: ICD-10-CM

## 2025-07-09 RX ORDER — PHENAZOPYRIDINE HYDROCHLORIDE 200 MG/1
200 TABLET, FILM COATED ORAL 3 TIMES DAILY
Qty: 30 TABLET | Refills: 0 | Status: SHIPPED | OUTPATIENT
Start: 2025-07-09 | End: 2025-07-19

## 2025-07-14 ENCOUNTER — APPOINTMENT (OUTPATIENT)
Dept: UROLOGY | Facility: CLINIC | Age: 60
End: 2025-07-14
Payer: COMMERCIAL

## 2025-07-14 DIAGNOSIS — N13.39 OTHER HYDRONEPHROSIS: Primary | ICD-10-CM

## 2025-07-14 DIAGNOSIS — R31.21 ASYMPTOMATIC MICROSCOPIC HEMATURIA: ICD-10-CM

## 2025-07-14 PROCEDURE — 88112 CYTOPATH CELL ENHANCE TECH: CPT

## 2025-07-14 PROCEDURE — 99204 OFFICE O/P NEW MOD 45 MIN: CPT | Performed by: STUDENT IN AN ORGANIZED HEALTH CARE EDUCATION/TRAINING PROGRAM

## 2025-07-14 PROCEDURE — 1036F TOBACCO NON-USER: CPT | Performed by: STUDENT IN AN ORGANIZED HEALTH CARE EDUCATION/TRAINING PROGRAM

## 2025-07-14 RX ORDER — CEFAZOLIN SODIUM 2 G/100ML
2 INJECTION, SOLUTION INTRAVENOUS ONCE
Status: CANCELLED | OUTPATIENT
Start: 2025-07-14 | End: 2025-07-14

## 2025-07-14 NOTE — PROGRESS NOTES
Scribed for Dr. Lenin Melgar by Hiren Johnson. I, Dr. Lenin Melgar have personally reviewed and agreed with the information entered by the Virtual Scribe. This visit was completed via telemedicine. All issues as below were discussed and addressed but no physical exam was performed unless allowed by visual confirmation. If it was felt that the patient should be evaluated in clinic, then they were directed there. Patient verbal consented to the visit. 07/14/25.    ASSESSMENT:  Problem List Items Addressed This Visit       Other hydronephrosis - Primary    Relevant Orders    Case Request Operating Room: RIGHT DIAGNOSTIC URETEROSCOPY, POSSIBLE BIOPSY, CYSTOSCOPY WITH RETROGRADE PYELOGRAM, POSSIBLE URETER STENT PLACEMENT (Completed)    Urinalysis with Reflex Culture and Microscopic    Asymptomatic microscopic hematuria    Relevant Orders    Case Request Operating Room: RIGHT DIAGNOSTIC URETEROSCOPY, POSSIBLE BIOPSY, CYSTOSCOPY WITH RETROGRADE PYELOGRAM, POSSIBLE URETER STENT PLACEMENT (Completed)    Urinalysis with Reflex Culture and Microscopic    Non-gynecologic cytology      PLAN:  #Hydroureteronephrosis, right  #Microscopic hematuria  Unclear etiology for her moderate right hydroureteronephrosis and microscopic hematuria. Elects to proceed with dURS + RPG [+/- biopsy, stent placement] for further evaluation and r/o bladder or upper tract malignancy. If lesion or tumor present will biopsy. Risks, benefits, and complications reviewed. Will repeat a urine microanalysis in the interim. Tentative OR date of July 16th, 2025.     All questions were answered to the patient’s satisfaction.  Patient agrees with the plan and wishes to proceed.  Continue follow-up for ongoing care of her chronic medical conditions.       History of Present Illness (HPI):  Adrianna presents virtually as a new patient for an evaluation.  The patient’s EMR has been reviewed.  Lives in [place], [place].   Occupation: Retired - .     Hx of  nephrolithiasis, OAB symptoms (bladder spasms; refractive to oxybutynin; uses Pyridium PRN), and hydroureteronephrosis. Previously seen by Dr. Adams, prior note reviewed (25). Recently developed R flank pain, initially seen in the ED. CTU (25) showed delayed nephrogram and mild-mod R HUN extending to the iliac crossing where the R ureter abruptly tapers, no evidence of stones. Unclear etiology for her hydronephrosis. PSH of  x2, no other abdominopelvic surgeries. Her flank pain resolved, but had since developed persistent bladder spasms. Referred to me for an evaluation and continued care.     TODAY: (25)  Reports developing acute R flank pain in 2025.   She went to the ED (06/10), and underwent work-up.   Her flank pain resolved, but then developed bladder spasms.   She thought she may have passed a stone.   Albeit, has not passed any notable stones in her urine.   She denies prior history of stones or gross hematuria.   Her flank pain has not recurred, but has persistent bladder spasms.     Acknowledges prior hx of breast cancer.   Treated in , s/p chemotherapy+RT.   As well as bilateral mastectomy.     Urine microanalysis (06/10/25): 6-9 RBC's, 0-2 WBC's.   Urine culture (25): negative.     CTU (25) personally reviewed and independently interpreted.  Delayed nephrogram on the right.   No renal masses or calculi bilaterally.   Moderate R HUN extending down to the pelvic brim.     PMH: nephrolithiasis, OAB, hydroureteronephrosis, breast cancer s/p chemotherapy+RT.   PSH: breast biopsy,  x2, tubal ligation, mastectomy b/l ().   FH: Denies FH of  malignancy.   SH: Former smoker.     Medical History[1]  Surgical History[2]  Family History[3]  Tobacco Use History[4]  Current Medications[5]  Allergies[6]  Past medical, surgical, family and social history in the chart was reviewed and is accurate including any additions to what is in this HPI.    REVIEW OF  SYSTEMS (ROS):   Constitutional: denies any unintentional weight loss or change in strength.  Integumentary: denies any rashes or pruritus.  Eyes: denies any double vision or eye pain.  Ear/Nose/Mouth/Throat: denies any nosebleeds or gum bleeds.  Cardiovascular: denies any chest pain or syncope.  Respiratory: denies hemoptysis.  Gastrointestinal: denies nausea or vomiting.  Musculoskeletal: denies muscle cramping or weakness.  Neurologic: denies convulsions or seizures.  Hematologic/Lymphatic: denies bleeding tendencies.  Endocrine: denies heat/cold intolerance.  All other systems have been reviewed and are negative unless otherwise noted in the HPI.     OBJECTIVE:  There were no vitals taken for this visit.  PHYSICAL EXAM:  PHYSICAL EXAM LIMITED  BEING A TELEHEALTH VISIT.     Labs and imaging:  Lab Results   Component Value Date    WBC 4.8 10/28/2024    HGB 13.1 10/28/2024    HCT 40.6 10/28/2024     10/28/2024    CHOL 183 10/14/2024    TRIG 100 10/14/2024    HDL 63.6 10/14/2024    ALT 13 10/14/2024    AST 14 10/14/2024     10/14/2024    K 4.4 10/14/2024     10/14/2024    CREATININE 0.75 10/14/2024    BUN 19 10/14/2024    CO2 31 10/14/2024    HGBA1C 6.1 (H) 10/14/2024       Scribed for Dr. Lenin Melgar by Hiren Johnson.  I, Dr. Lenin Melgar have personally reviewed and agreed with the information entered by the Virtual Scribe. 25          [1]   Past Medical History:  Diagnosis Date    Cancer (Multi) 2011    Encounter for screening for malignant neoplasm of vagina     Vaginal Pap smear    Kidney stone 6/10/2025    Postmenopausal bleeding 08/15/2023    Urinary tract infection 2025   [2]   Past Surgical History:  Procedure Laterality Date    BREAST BIOPSY  2011     SECTION, LOW TRANSVERSE  2005    COLONOSCOPY  2015    MASTECTOMY Bilateral 2011    OTHER SURGICAL HISTORY  2012    [place] BREAST AUGMENTATION WITH IMPLANT  2012    TUBAL LIGATION  2005   [3]    Family History  Problem Relation Name Age of Onset    Pancreatic cancer Mother [place] Cage     Cancer Mother [place] Cage     Diabetes Father Jet Cage     Pancreatic cancer Mother's Sister      Cancer Sister Clau Carvalho    [4]   Social History  Tobacco Use   Smoking Status Former    Current packs/day: 0.00    Types: Cigarettes    Start date: 1985    Quit date: 1987    Years since quittin.5   Smokeless Tobacco Never   [5]   Current Outpatient Medications   Medication Sig Dispense Refill    aspirin 81 mg EC tablet Take 1 tablet (81 mg) by mouth once daily.      CALCIUM CARBONATE-VITAMIN D3 ORAL Take 1 tablet by mouth once daily.      cholecalciferol (Vitamin D-3) 25 MCG (1000 UT) capsule Take 1 capsule (25 mcg) by mouth once daily.      ketorolac (Toradol) 10 mg tablet TAKE 1 TABLET (10 MG) BY MOUTH EVERY 6 HOURS IF NEEDED FOR MODERATE PAIN (4 - 6). 20 tablet 0    multivitamin tablet Take 1 tablet by mouth once daily.      phenazopyridine (Pyridium) 200 mg tablet Take 1 tablet (200 mg) by mouth 3 times a day for 10 days. 30 tablet 0     No current facility-administered medications for this visit.   [6]   Allergies  Allergen Reactions    Sulfamethoxazole Hives     Bactrim

## 2025-07-14 NOTE — H&P (VIEW-ONLY)
Scribed for Dr. Lenin Melgar by Hiren Johnson. I, Dr. Lenin Melgar have personally reviewed and agreed with the information entered by the Virtual Scribe. This visit was completed via telemedicine. All issues as below were discussed and addressed but no physical exam was performed unless allowed by visual confirmation. If it was felt that the patient should be evaluated in clinic, then they were directed there. Patient verbal consented to the visit. 07/14/25.    ASSESSMENT:  Problem List Items Addressed This Visit       Other hydronephrosis - Primary    Relevant Orders    Case Request Operating Room: RIGHT DIAGNOSTIC URETEROSCOPY, POSSIBLE BIOPSY, CYSTOSCOPY WITH RETROGRADE PYELOGRAM, POSSIBLE URETER STENT PLACEMENT (Completed)    Urinalysis with Reflex Culture and Microscopic    Asymptomatic microscopic hematuria    Relevant Orders    Case Request Operating Room: RIGHT DIAGNOSTIC URETEROSCOPY, POSSIBLE BIOPSY, CYSTOSCOPY WITH RETROGRADE PYELOGRAM, POSSIBLE URETER STENT PLACEMENT (Completed)    Urinalysis with Reflex Culture and Microscopic    Non-gynecologic cytology      PLAN:  #Hydroureteronephrosis, right  #Microscopic hematuria  Unclear etiology for her moderate right hydroureteronephrosis and microscopic hematuria. Elects to proceed with dURS + RPG [+/- biopsy, stent placement] for further evaluation and r/o bladder or upper tract malignancy. If lesion or tumor present will biopsy. Risks, benefits, and complications reviewed. Will repeat a urine microanalysis in the interim. Tentative OR date of July 16th, 2025.     All questions were answered to the patient’s satisfaction.  Patient agrees with the plan and wishes to proceed.  Continue follow-up for ongoing care of her chronic medical conditions.       History of Present Illness (HPI):  Adrianna presents virtually as a new patient for an evaluation.  The patient’s EMR has been reviewed.  Lives in [place], [place].   Occupation: Retired - .     Hx of  nephrolithiasis, OAB symptoms (bladder spasms; refractive to oxybutynin; uses Pyridium PRN), and hydroureteronephrosis. Previously seen by Dr. Adams, prior note reviewed (25). Recently developed R flank pain, initially seen in the ED. CTU (25) showed delayed nephrogram and mild-mod R HUN extending to the iliac crossing where the R ureter abruptly tapers, no evidence of stones. Unclear etiology for her hydronephrosis. PSH of  x2, no other abdominopelvic surgeries. Her flank pain resolved, but had since developed persistent bladder spasms. Referred to me for an evaluation and continued care.     TODAY: (25)  Reports developing acute R flank pain in 2025.   She went to the ED (06/10), and underwent work-up.   Her flank pain resolved, but then developed bladder spasms.   She thought she may have passed a stone.   Albeit, has not passed any notable stones in her urine.   She denies prior history of stones or gross hematuria.   Her flank pain has not recurred, but has persistent bladder spasms.     Acknowledges prior hx of breast cancer.   Treated in , s/p chemotherapy+RT.   As well as bilateral mastectomy.     Urine microanalysis (06/10/25): 6-9 RBC's, 0-2 WBC's.   Urine culture (25): negative.     CTU (25) personally reviewed and independently interpreted.  Delayed nephrogram on the right.   No renal masses or calculi bilaterally.   Moderate R HUN extending down to the pelvic brim.     PMH: nephrolithiasis, OAB, hydroureteronephrosis, breast cancer s/p chemotherapy+RT.   PSH: breast biopsy,  x2, tubal ligation, mastectomy b/l ().   FH: Denies FH of  malignancy.   SH: Former smoker.     Medical History[1]  Surgical History[2]  Family History[3]  Tobacco Use History[4]  Current Medications[5]  Allergies[6]  Past medical, surgical, family and social history in the chart was reviewed and is accurate including any additions to what is in this HPI.    REVIEW OF  SYSTEMS (ROS):   Constitutional: denies any unintentional weight loss or change in strength.  Integumentary: denies any rashes or pruritus.  Eyes: denies any double vision or eye pain.  Ear/Nose/Mouth/Throat: denies any nosebleeds or gum bleeds.  Cardiovascular: denies any chest pain or syncope.  Respiratory: denies hemoptysis.  Gastrointestinal: denies nausea or vomiting.  Musculoskeletal: denies muscle cramping or weakness.  Neurologic: denies convulsions or seizures.  Hematologic/Lymphatic: denies bleeding tendencies.  Endocrine: denies heat/cold intolerance.  All other systems have been reviewed and are negative unless otherwise noted in the HPI.     OBJECTIVE:  There were no vitals taken for this visit.  PHYSICAL EXAM:  PHYSICAL EXAM LIMITED  BEING A TELEHEALTH VISIT.     Labs and imaging:  Lab Results   Component Value Date    WBC 4.8 10/28/2024    HGB 13.1 10/28/2024    HCT 40.6 10/28/2024     10/28/2024    CHOL 183 10/14/2024    TRIG 100 10/14/2024    HDL 63.6 10/14/2024    ALT 13 10/14/2024    AST 14 10/14/2024     10/14/2024    K 4.4 10/14/2024     10/14/2024    CREATININE 0.75 10/14/2024    BUN 19 10/14/2024    CO2 31 10/14/2024    HGBA1C 6.1 (H) 10/14/2024       Scribed for Dr. Lenin Melgar by Hiren Johnson.  I, Dr. Lenin Melgar have personally reviewed and agreed with the information entered by the Virtual Scribe. 25          [1]   Past Medical History:  Diagnosis Date    Cancer (Multi) 2011    Encounter for screening for malignant neoplasm of vagina     Vaginal Pap smear    Kidney stone 6/10/2025    Postmenopausal bleeding 08/15/2023    Urinary tract infection 2025   [2]   Past Surgical History:  Procedure Laterality Date    BREAST BIOPSY  2011     SECTION, LOW TRANSVERSE  2005    COLONOSCOPY  2015    MASTECTOMY Bilateral 2011    OTHER SURGICAL HISTORY  2012    [place] BREAST AUGMENTATION WITH IMPLANT  2012    TUBAL LIGATION  2005   [3]    Family History  Problem Relation Name Age of Onset    Pancreatic cancer Mother [place] Cage     Cancer Mother [place] Cage     Diabetes Father Jet Cage     Pancreatic cancer Mother's Sister      Cancer Sister Clau Carvalho    [4]   Social History  Tobacco Use   Smoking Status Former    Current packs/day: 0.00    Types: Cigarettes    Start date: 1985    Quit date: 1987    Years since quittin.5   Smokeless Tobacco Never   [5]   Current Outpatient Medications   Medication Sig Dispense Refill    aspirin 81 mg EC tablet Take 1 tablet (81 mg) by mouth once daily.      CALCIUM CARBONATE-VITAMIN D3 ORAL Take 1 tablet by mouth once daily.      cholecalciferol (Vitamin D-3) 25 MCG (1000 UT) capsule Take 1 capsule (25 mcg) by mouth once daily.      ketorolac (Toradol) 10 mg tablet TAKE 1 TABLET (10 MG) BY MOUTH EVERY 6 HOURS IF NEEDED FOR MODERATE PAIN (4 - 6). 20 tablet 0    multivitamin tablet Take 1 tablet by mouth once daily.      phenazopyridine (Pyridium) 200 mg tablet Take 1 tablet (200 mg) by mouth 3 times a day for 10 days. 30 tablet 0     No current facility-administered medications for this visit.   [6]   Allergies  Allergen Reactions    Sulfamethoxazole Hives     Bactrim

## 2025-07-15 ENCOUNTER — ANESTHESIA EVENT (OUTPATIENT)
Dept: OPERATING ROOM | Facility: HOSPITAL | Age: 60
End: 2025-07-15
Payer: COMMERCIAL

## 2025-07-15 ENCOUNTER — LAB (OUTPATIENT)
Dept: LAB | Facility: HOSPITAL | Age: 60
End: 2025-07-15
Payer: COMMERCIAL

## 2025-07-15 DIAGNOSIS — R31.21 ASYMPTOMATIC MICROSCOPIC HEMATURIA: Primary | ICD-10-CM

## 2025-07-16 ENCOUNTER — APPOINTMENT (OUTPATIENT)
Dept: RADIOLOGY | Facility: HOSPITAL | Age: 60
End: 2025-07-16
Payer: COMMERCIAL

## 2025-07-16 ENCOUNTER — ANESTHESIA (OUTPATIENT)
Dept: OPERATING ROOM | Facility: HOSPITAL | Age: 60
End: 2025-07-16
Payer: COMMERCIAL

## 2025-07-16 ENCOUNTER — HOSPITAL ENCOUNTER (OUTPATIENT)
Facility: HOSPITAL | Age: 60
Setting detail: OUTPATIENT SURGERY
Discharge: HOME | End: 2025-07-16
Attending: STUDENT IN AN ORGANIZED HEALTH CARE EDUCATION/TRAINING PROGRAM | Admitting: STUDENT IN AN ORGANIZED HEALTH CARE EDUCATION/TRAINING PROGRAM
Payer: COMMERCIAL

## 2025-07-16 VITALS
OXYGEN SATURATION: 93 % | DIASTOLIC BLOOD PRESSURE: 93 MMHG | TEMPERATURE: 97.7 F | WEIGHT: 153 LBS | RESPIRATION RATE: 16 BRPM | HEART RATE: 84 BPM | SYSTOLIC BLOOD PRESSURE: 158 MMHG | BODY MASS INDEX: 24.59 KG/M2 | HEIGHT: 66 IN

## 2025-07-16 DIAGNOSIS — R31.21 ASYMPTOMATIC MICROSCOPIC HEMATURIA: ICD-10-CM

## 2025-07-16 DIAGNOSIS — N13.39 OTHER HYDRONEPHROSIS: ICD-10-CM

## 2025-07-16 LAB
LABORATORY COMMENT REPORT: NORMAL
LABORATORY COMMENT REPORT: NORMAL
PATH REPORT.FINAL DX SPEC: NORMAL
PATH REPORT.GROSS SPEC: NORMAL
PATH REPORT.RELEVANT HX SPEC: NORMAL
PATH REPORT.TOTAL CANCER: NORMAL

## 2025-07-16 PROCEDURE — 7100000002 HC RECOVERY ROOM TIME - EACH INCREMENTAL 1 MINUTE: Performed by: STUDENT IN AN ORGANIZED HEALTH CARE EDUCATION/TRAINING PROGRAM

## 2025-07-16 PROCEDURE — 2720000007 HC OR 272 NO HCPCS: Performed by: STUDENT IN AN ORGANIZED HEALTH CARE EDUCATION/TRAINING PROGRAM

## 2025-07-16 PROCEDURE — 88112 CYTOPATH CELL ENHANCE TECH: CPT | Mod: TC,MCY | Performed by: STUDENT IN AN ORGANIZED HEALTH CARE EDUCATION/TRAINING PROGRAM

## 2025-07-16 PROCEDURE — 2780000003 HC OR 278 NO HCPCS: Performed by: STUDENT IN AN ORGANIZED HEALTH CARE EDUCATION/TRAINING PROGRAM

## 2025-07-16 PROCEDURE — 3700000001 HC GENERAL ANESTHESIA TIME - INITIAL BASE CHARGE: Performed by: STUDENT IN AN ORGANIZED HEALTH CARE EDUCATION/TRAINING PROGRAM

## 2025-07-16 PROCEDURE — 52351 CYSTOURETERO & OR PYELOSCOPE: CPT | Performed by: STUDENT IN AN ORGANIZED HEALTH CARE EDUCATION/TRAINING PROGRAM

## 2025-07-16 PROCEDURE — 3600000009 HC OR TIME - EACH INCREMENTAL 1 MINUTE - PROCEDURE LEVEL FOUR: Performed by: STUDENT IN AN ORGANIZED HEALTH CARE EDUCATION/TRAINING PROGRAM

## 2025-07-16 PROCEDURE — 2550000001 HC RX 255 CONTRASTS: Performed by: STUDENT IN AN ORGANIZED HEALTH CARE EDUCATION/TRAINING PROGRAM

## 2025-07-16 PROCEDURE — 7100000009 HC PHASE TWO TIME - INITIAL BASE CHARGE: Performed by: STUDENT IN AN ORGANIZED HEALTH CARE EDUCATION/TRAINING PROGRAM

## 2025-07-16 PROCEDURE — 7100000010 HC PHASE TWO TIME - EACH INCREMENTAL 1 MINUTE: Performed by: STUDENT IN AN ORGANIZED HEALTH CARE EDUCATION/TRAINING PROGRAM

## 2025-07-16 PROCEDURE — C2617 STENT, NON-COR, TEM W/O DEL: HCPCS | Performed by: STUDENT IN AN ORGANIZED HEALTH CARE EDUCATION/TRAINING PROGRAM

## 2025-07-16 PROCEDURE — C1769 GUIDE WIRE: HCPCS | Performed by: STUDENT IN AN ORGANIZED HEALTH CARE EDUCATION/TRAINING PROGRAM

## 2025-07-16 PROCEDURE — 3700000002 HC GENERAL ANESTHESIA TIME - EACH INCREMENTAL 1 MINUTE: Performed by: STUDENT IN AN ORGANIZED HEALTH CARE EDUCATION/TRAINING PROGRAM

## 2025-07-16 PROCEDURE — 3600000004 HC OR TIME - INITIAL BASE CHARGE - PROCEDURE LEVEL FOUR: Performed by: STUDENT IN AN ORGANIZED HEALTH CARE EDUCATION/TRAINING PROGRAM

## 2025-07-16 PROCEDURE — 52332 CYSTOSCOPY AND TREATMENT: CPT | Performed by: STUDENT IN AN ORGANIZED HEALTH CARE EDUCATION/TRAINING PROGRAM

## 2025-07-16 PROCEDURE — 74420 UROGRAPHY RTRGR +-KUB: CPT | Performed by: STUDENT IN AN ORGANIZED HEALTH CARE EDUCATION/TRAINING PROGRAM

## 2025-07-16 PROCEDURE — 3600000003 HC OR TIME - INITIAL BASE CHARGE - PROCEDURE LEVEL THREE: Performed by: STUDENT IN AN ORGANIZED HEALTH CARE EDUCATION/TRAINING PROGRAM

## 2025-07-16 PROCEDURE — 3600000008 HC OR TIME - EACH INCREMENTAL 1 MINUTE - PROCEDURE LEVEL THREE: Performed by: STUDENT IN AN ORGANIZED HEALTH CARE EDUCATION/TRAINING PROGRAM

## 2025-07-16 PROCEDURE — 2500000004 HC RX 250 GENERAL PHARMACY W/ HCPCS (ALT 636 FOR OP/ED): Performed by: STUDENT IN AN ORGANIZED HEALTH CARE EDUCATION/TRAINING PROGRAM

## 2025-07-16 PROCEDURE — 7100000001 HC RECOVERY ROOM TIME - INITIAL BASE CHARGE: Performed by: STUDENT IN AN ORGANIZED HEALTH CARE EDUCATION/TRAINING PROGRAM

## 2025-07-16 PROCEDURE — 2500000004 HC RX 250 GENERAL PHARMACY W/ HCPCS (ALT 636 FOR OP/ED)

## 2025-07-16 PROCEDURE — 2500000005 HC RX 250 GENERAL PHARMACY W/O HCPCS: Performed by: STUDENT IN AN ORGANIZED HEALTH CARE EDUCATION/TRAINING PROGRAM

## 2025-07-16 DEVICE — INLAY OPTIMA URETERAL STENT W/O GUIDEWIRE
Type: IMPLANTABLE DEVICE | Site: URETER | Status: FUNCTIONAL
Brand: BARD® INLAY OPTIMA® URETERAL STENT

## 2025-07-16 RX ORDER — DROPERIDOL 2.5 MG/ML
0.62 INJECTION, SOLUTION INTRAMUSCULAR; INTRAVENOUS ONCE AS NEEDED
Status: CANCELLED | OUTPATIENT
Start: 2025-07-16

## 2025-07-16 RX ORDER — SODIUM CHLORIDE, SODIUM LACTATE, POTASSIUM CHLORIDE, CALCIUM CHLORIDE 600; 310; 30; 20 MG/100ML; MG/100ML; MG/100ML; MG/100ML
100 INJECTION, SOLUTION INTRAVENOUS CONTINUOUS
Status: CANCELLED | OUTPATIENT
Start: 2025-07-16 | End: 2025-07-17

## 2025-07-16 RX ORDER — CEFAZOLIN 1 G/1
INJECTION, POWDER, FOR SOLUTION INTRAVENOUS AS NEEDED
Status: DISCONTINUED | OUTPATIENT
Start: 2025-07-16 | End: 2025-07-16

## 2025-07-16 RX ORDER — METOCLOPRAMIDE HYDROCHLORIDE 5 MG/ML
10 INJECTION INTRAMUSCULAR; INTRAVENOUS ONCE AS NEEDED
Status: CANCELLED | OUTPATIENT
Start: 2025-07-16

## 2025-07-16 RX ORDER — TAMSULOSIN HYDROCHLORIDE 0.4 MG/1
0.4 CAPSULE ORAL DAILY
Qty: 30 CAPSULE | Refills: 2 | Status: SHIPPED | OUTPATIENT
Start: 2025-07-16

## 2025-07-16 RX ORDER — SUCCINYLCHOLINE CHLORIDE 20 MG/ML
INJECTION INTRAMUSCULAR; INTRAVENOUS AS NEEDED
Status: DISCONTINUED | OUTPATIENT
Start: 2025-07-16 | End: 2025-07-16

## 2025-07-16 RX ORDER — PHENYLEPHRINE HCL IN 0.9% NACL 0.4MG/10ML
SYRINGE (ML) INTRAVENOUS AS NEEDED
Status: DISCONTINUED | OUTPATIENT
Start: 2025-07-16 | End: 2025-07-16

## 2025-07-16 RX ORDER — OXYCODONE HYDROCHLORIDE 5 MG/1
10 TABLET ORAL EVERY 4 HOURS PRN
Refills: 0 | Status: CANCELLED | OUTPATIENT
Start: 2025-07-16

## 2025-07-16 RX ORDER — PROPOFOL 10 MG/ML
INJECTION, EMULSION INTRAVENOUS AS NEEDED
Status: DISCONTINUED | OUTPATIENT
Start: 2025-07-16 | End: 2025-07-16

## 2025-07-16 RX ORDER — SODIUM CHLORIDE, SODIUM LACTATE, POTASSIUM CHLORIDE, CALCIUM CHLORIDE 600; 310; 30; 20 MG/100ML; MG/100ML; MG/100ML; MG/100ML
INJECTION, SOLUTION INTRAVENOUS CONTINUOUS PRN
Status: DISCONTINUED | OUTPATIENT
Start: 2025-07-16 | End: 2025-07-16

## 2025-07-16 RX ORDER — ACETAMINOPHEN 325 MG/1
650 TABLET ORAL EVERY 4 HOURS PRN
Status: CANCELLED | OUTPATIENT
Start: 2025-07-16

## 2025-07-16 RX ORDER — MIDAZOLAM HYDROCHLORIDE 2 MG/2ML
INJECTION, SOLUTION INTRAMUSCULAR; INTRAVENOUS AS NEEDED
Status: DISCONTINUED | OUTPATIENT
Start: 2025-07-16 | End: 2025-07-16

## 2025-07-16 RX ORDER — OXYBUTYNIN CHLORIDE 10 MG/1
10 TABLET, EXTENDED RELEASE ORAL DAILY
Qty: 30 TABLET | Refills: 2 | Status: SHIPPED | OUTPATIENT
Start: 2025-07-16

## 2025-07-16 RX ORDER — LABETALOL HYDROCHLORIDE 5 MG/ML
5 INJECTION, SOLUTION INTRAVENOUS ONCE AS NEEDED
Status: CANCELLED | OUTPATIENT
Start: 2025-07-16

## 2025-07-16 RX ORDER — HYDROMORPHONE HYDROCHLORIDE 0.2 MG/ML
0.2 INJECTION INTRAMUSCULAR; INTRAVENOUS; SUBCUTANEOUS EVERY 5 MIN PRN
Status: CANCELLED | OUTPATIENT
Start: 2025-07-16

## 2025-07-16 RX ORDER — FENTANYL CITRATE 50 UG/ML
INJECTION, SOLUTION INTRAMUSCULAR; INTRAVENOUS CONTINUOUS PRN
Status: DISCONTINUED | OUTPATIENT
Start: 2025-07-16 | End: 2025-07-16

## 2025-07-16 RX ORDER — CEFAZOLIN SODIUM 2 G/100ML
2 INJECTION, SOLUTION INTRAVENOUS ONCE
Status: COMPLETED | OUTPATIENT
Start: 2025-07-16 | End: 2025-07-16

## 2025-07-16 RX ORDER — LIDOCAINE HYDROCHLORIDE 10 MG/ML
0.1 INJECTION, SOLUTION EPIDURAL; INFILTRATION; INTRACAUDAL; PERINEURAL ONCE
Status: CANCELLED | OUTPATIENT
Start: 2025-07-16 | End: 2025-07-16

## 2025-07-16 RX ORDER — SODIUM CHLORIDE 0.9 G/100ML
INJECTION, SOLUTION IRRIGATION AS NEEDED
Status: DISCONTINUED | OUTPATIENT
Start: 2025-07-16 | End: 2025-07-16 | Stop reason: HOSPADM

## 2025-07-16 RX ORDER — OXYCODONE AND ACETAMINOPHEN 5; 325 MG/1; MG/1
1 TABLET ORAL EVERY 4 HOURS PRN
Refills: 0 | Status: CANCELLED | OUTPATIENT
Start: 2025-07-16

## 2025-07-16 RX ADMIN — CEFAZOLIN SODIUM 2 G: 2 INJECTION, SOLUTION INTRAVENOUS at 10:39

## 2025-07-16 RX ADMIN — PROPOFOL 60 MG: 10 INJECTION, EMULSION INTRAVENOUS at 11:53

## 2025-07-16 RX ADMIN — SODIUM CHLORIDE, POTASSIUM CHLORIDE, SODIUM LACTATE AND CALCIUM CHLORIDE: 600; 310; 30; 20 INJECTION, SOLUTION INTRAVENOUS at 11:48

## 2025-07-16 RX ADMIN — CEFAZOLIN 2 G: 1 INJECTION, POWDER, FOR SOLUTION INTRAMUSCULAR; INTRAVENOUS at 11:15

## 2025-07-16 RX ADMIN — MIDAZOLAM HYDROCHLORIDE 2 MG: 2 INJECTION, SOLUTION INTRAMUSCULAR; INTRAVENOUS at 11:42

## 2025-07-16 RX ADMIN — Medication 120 MCG: at 12:14

## 2025-07-16 RX ADMIN — SUCCINYLCHOLINE CHLORIDE 14 MG: 20 INJECTION, SOLUTION INTRAMUSCULAR; INTRAVENOUS at 11:53

## 2025-07-16 RX ADMIN — DEXAMETHASONE SODIUM PHOSPHATE 4 MG: 4 INJECTION INTRA-ARTICULAR; INTRALESIONAL; INTRAMUSCULAR; INTRAVENOUS; SOFT TISSUE at 12:14

## 2025-07-16 ASSESSMENT — PAIN SCALES - GENERAL
PAINLEVEL_OUTOF10: 0 - NO PAIN
PAINLEVEL_OUTOF10: 2
PAIN_LEVEL: 0
PAINLEVEL_OUTOF10: 0 - NO PAIN

## 2025-07-16 ASSESSMENT — COLUMBIA-SUICIDE SEVERITY RATING SCALE - C-SSRS
1. IN THE PAST MONTH, HAVE YOU WISHED YOU WERE DEAD OR WISHED YOU COULD GO TO SLEEP AND NOT WAKE UP?: NO
6. HAVE YOU EVER DONE ANYTHING, STARTED TO DO ANYTHING, OR PREPARED TO DO ANYTHING TO END YOUR LIFE?: NO
2. HAVE YOU ACTUALLY HAD ANY THOUGHTS OF KILLING YOURSELF?: NO

## 2025-07-16 ASSESSMENT — PAIN - FUNCTIONAL ASSESSMENT: PAIN_FUNCTIONAL_ASSESSMENT: 0-10

## 2025-07-16 NOTE — ANESTHESIA POSTPROCEDURE EVALUATION
Patient: Adrianna Quinones    Procedure Summary       Date: 07/16/25 Room / Location: [facility] OR 04 / [facility]    Anesthesia Start: 1148 Anesthesia Stop: 1259    Procedure: RIGHT DIAGNOSTIC URETEROSCOPY, POSSIBLE BIOPSY, CYSTOSCOPY WITH RETROGRADE PYELOGRAM, POSSIBLE URETER STENT PLACEMENT (Pelvis) Diagnosis:       Other hydronephrosis      Asymptomatic microscopic hematuria      (Other hydronephrosis [N13.39])      (Asymptomatic microscopic hematuria [R31.21])    Surgeons: Lenin Melgar MD Responsible Provider: John East MD    Anesthesia Type: general ASA Status: 2            Anesthesia Type: general    Vitals Value Taken Time   /59 07/16/25 12:59   Temp 36.4 07/16/25 12:59   Pulse 68 07/16/25 12:59   Resp 16 07/16/25 12:59   SpO2 98 07/16/25 12:59       Anesthesia Post Evaluation    Patient location during evaluation: PACU  Patient participation: complete - patient participated  Level of consciousness: awake  Pain score: 0  Pain management: adequate  Airway patency: patent  Cardiovascular status: acceptable and blood pressure returned to baseline  Respiratory status: acceptable and face mask  Hydration status: acceptable  Postoperative Nausea and Vomiting: none        There were no known notable events for this encounter.

## 2025-07-16 NOTE — OP NOTE
RIGHT DIAGNOSTIC URETEROSCOPY, CYSTOSCOPY WITH RETROGRADE PYELOGRAM, RIGHT URETER STENT PLACEMENT (R) Operative Note     Date: 2025  OR Location: [facility] OR    Name: Adrianna Quinones, : 1965, Age: 59 y.o., MRN: [id number], Sex: female    Diagnosis  Pre-op Diagnosis      * Other hydronephrosis [N13.39]     * Asymptomatic microscopic hematuria [R31.21] Post-op Diagnosis     * Other hydronephrosis [N13.39]     * Asymptomatic microscopic hematuria [R31.21]     Procedures  RIGHT DIAGNOSTIC URETEROSCOPY, CYSTOSCOPY WITH RETROGRADE PYELOGRAM, RIGHT URETER STENT PLACEMENT  04266 - AK CYSTO W/URTROSCOPY&/PYELOSCOPY DX    AK CYSTO/PYELOSCOPY BX&/FULGURATION PELIVC LESION [83427]  CHG UROGRAPHY RETROGRADE WITH/WO KUB [03839]  AK CYSTO W/INSERT URETERAL STENT [25253]  Surgeons      * Lenin Melgar - Primary    Resident/Fellow/Other Assistant:  Surgeons and Role:     * Chevy Casas MD - Fellow    Staff:   Circulator: Vidhi  Scrub Person: Krysten Barker Circulator: Vangie Barker Scrub: Christine    Anesthesia Staff: Anesthesiologist: John East MD  Anesthesia Resident: Ruth Bal MD; Olena Park MD    Procedure Summary  Anesthesia: General  ASA: II  Estimated Blood Loss: 1mL  Intra-op Medications:   Administrations occurring from 1030 to 1220 on 25:   Medication Name Total Dose   ceFAZolin (Ancef) 2 g in dextrose (iso)  mL 2 g   ceFAZolin (Ancef) 1 g 2 g   dexAMETHasone (Decadron) 4 mg/mL IV Syringe 2 mL 4 mg   LR infusion Cannot be calculated   midazolam PF (Versed) injection 1 mg/mL 2 mg   phenylephrine 40 mcg/mL syringe 10 mL 120 mcg   propofol (Diprivan) injection 10 mg/mL 60 mg   succinylcholine 20 mg/mL VIAL 14 mg              Anesthesia Record               Intraprocedure I/O Totals          Intake    LR infusion 500.00 mL    Total Intake 500 mL          Specimen:   ID Type Source Tests Collected by Time   1 : URINE FROM THE RIGHT URETER FOR CYTOLOGY Non-Gynecologic Cytology URINE  CATHETERIZED CYTOLOGY CONSULTATION (NON-GYNECOLOGIC) Lenin Melgar MD 2025 1242                 Drains and/or Catheters: * None in log *    Tourniquet Times:         Implants:  Implants       Type Name Action Serial No.      Stent STENT, AMADAAY OPTIMA, 6FR X 24CM - [id number] Implanted               Findings: bladder and urethral mucosa grossly within normal limits; right ureter without evidence of stone or tumor; narrowing of ureteral lumen noted at the corresponding site on CT.     Indications: Adrianna Quinones is an 59 y.o. female Hx of nephrolithiasis, OAB symptoms (bladder spasms; refractive to oxybutynin; uses Pyridium PRN), and hydroureteronephrosis. Previously seen by Dr. Adams, prior note reviewed (25). Recently developed R flank pain, initially seen in the ED. CTU (25) showed delayed nephrogram and mild-mod R HUN extending to the iliac crossing where the R ureter abruptly tapers, no evidence of stones. Unclear etiology for her hydronephrosis. PSH of  x2, no other abdominopelvic surgeries. Her flank pain resolved, but had since developed persistent bladder spasms. Planned for diagnostic cystoscopy, ureteroscopy (right), possible biopsy, possible stent.     The patient was seen in the preoperative area. The risks, benefits, complications, treatment options, non-operative alternatives, expected recovery and outcomes were discussed with the patient. The possibilities of reaction to medication, pulmonary aspiration, injury to surrounding structures, bleeding, recurrent infection, the need for additional procedures, failure to diagnose a condition, and creating a complication requiring transfusion or operation were discussed with the patient. The patient concurred with the proposed plan, giving informed consent.  The site of surgery was properly noted/marked if necessary per policy. The patient has been actively warmed in preoperative area. Preoperative antibiotics have been ordered  and given within 1 hours of incision. Venous thrombosis prophylaxis have been ordered including bilateral sequential compression devices    Procedure Details: Patient consented to procedure in preoperative area. Risks and benefits discussed. Patient was marked on the right side. Allergies were reviewed and preoperative antibiotics were administered. Patient was brought to the operating room and placed in supine position on the operating room table. A timeout was performed. All were in agreement. Patient underwent general anesthesia without complication. They were repositioned in dorsal lithotomy and prepped and draped in the usual sterile fashion. A 21 fr rigid cystoscope was used to perform cystourethroscopy. Urethra was normal. Bilateral UOs were in normal orthotopic position. No masses or stones were identified in the bladder. Sensor wire was placed through a 5Fr ureteral catheter to the level of the kidney as confirmed on fluoroscopy. The catheter was advanced into the distal ureter. The wire was then removed.  Retrograde pyelogram was performed.     Retrograde pyelogram interpretation: area of narrowing noted at the location of iliac vessels corresponding to CT. Mild dilation of ureter proximal to this with a small segment of tortuosity proximally. Remainder of ureter appeared normal; normal collecting system.     The wire was advanced into the catheter and the catheter removed. A semi-rigid ureteroscope was advanced alongside the wire into the right ureter and to the site of narrowing. No tumor or stone was identified. The lumen appeared to be narrowing but easily passed with gentle pressure on the scope. Proximal to this the ureter was noted to be slightly dilated. The scope was withdrawn with the wire left in place.     A 6x24 JJ stent (no string) was placed over the safety wire with proximal curl noted in kidney on fluoro and distal curl in the bladder on fluoro. Bladder was drained, instruments removed.  This concluded the procedure. Patient was awoken from anesthesia without complication and transferred to PACU in stable condition.    Evidence of Infection: No   Complications:  None; patient tolerated the procedure well.    Disposition: PACU - hemodynamically stable.  Condition: stable   Additional Details: No source of obstruction identified. Will order PET/CT to assess if external source of obstruction identifiable. Will order MRI if negative.     Attending Attestation: I was present and scrubbed for the entire procedure.    Lenin Melgar  Phone Number: [phone]

## 2025-07-16 NOTE — DISCHARGE INSTRUCTIONS
DEPARTMENT OF UROLOGY  DISCHARGE INSTRUCTIONS  Outpatient Surgery    C O N F I D E N T I A L   I N F O R M A T I O N    Call [phone] during regular daytime business hours (8:00 am - 5:00 pm) and after 5:00 pm  and ask for the Urology resident with any questions or concerns.      If it is a life-threatening situation, proceed to the nearest emergency department.        Thank you for the opportunity to care for you today.  Your health and healing are very important to us.  We hope we made you feel as comfortable as possible and are committed to your recovery and continued well-being.      The following is a brief overview of your cystoscopy stent insertion/exchange procedure today. Some of the information contained on this summary may be confidential.  This information should be kept in your records and should be shared with your regular doctor.    Procedure performed: cystoscopy (looked inside your bladder) with insertion of a new stent/exchange of previous stent  Pending results:  none     What to Expect During your Recovery and Home Care  Anesthesia Side Effects   You received general anesthesia today.  You may feel sleepy, tired, or have a sore throat.   You may also feel drowsiness, dizziness, or inability to think clearly.  For your safety, do not drive, drink alcoholic beverages, take any unprescribed medication or make any important decisions for 24 hours.  A responsible adult should be with you for 24 hours.        Activity and Recovery    No heavy lifting today. Rest for the next 24 hours.     Pain Control  Unfortunately, you may experience pain after your procedure.  Frequency and urgency to urinate and mild discomfort are expected. Adequate pain management can include alternative measures to help ease your pain and that can include over the counter Tylenol/ibuprofen and a heating pad which can be taken as prescribed as needed for breakthrough pain. Do not take more than 4,000mg of Tylenol in a 24-hour  period.      You may have also been prescribed Pyridium (for burning sensation) and Ditropan(for bladder spasms) for pain control. Pyridium will turn your urine bright orange.    Nausea/Vomiting   Clear liquids are best tolerated at first. Start slow, advance your diet as tolerated to normal foods. Avoid spicy, greasy, heavy foods at first. Also, you may feel nauseous or like you need to vomit if you take any type of medication on an empty stomach.  Call your physician if you are unable to eat or drink and have persistent vomiting.    Signs of Bleeding   You could have some blood in your urine off and on over the next several weeks. Your urine will be light pink to yellow.    Treatment/wound care:   It is okay to shower 24 hours after time of surgery.      Signs of Infection  Signs of infection can include fever, drainage(green/yellow), chills, burning sensation with passing of urine, or severe abdominal pain.  If you see any of these occur, please contact your doctor's office at [phone].  Any fever higher than 100.4, especially if associated with an ill feeling, abdominal pain, chills, or nausea should be reported to your surgeon.      Assist in bowel movements/urination  Increase fiber in diet  Increase water (6 to 8 glasses)  Increase walking   Urination should occur within 6 hours of anesthesia  If you have tried these methods and your bladder still feels full and you cannot use the bathroom, please go to your nearest Emergency room/contact your physician.    Additional Instructions:     You had a stent placed today from your kidney down into your bladder. This helps keep the urinary tract open and prevents blockages of urine flow. The stent can be irritating and can cause some frequency, urgency and blood in your urine when you go to the bathroom. It is important to drink plenty of water and take medications as prescribed. You may be sent home with Pyridium which turns your urine bright orange. Applying a  heating pad to your back will also help with this kind of pain.

## 2025-07-16 NOTE — ANESTHESIA PROCEDURE NOTES
Peripheral IV  Date/Time: 7/16/2025 11:57 AM      Placement  Needle size: 20 G  Laterality: left  Location: hand

## 2025-07-16 NOTE — ANESTHESIA PROCEDURE NOTES
Airway  Date/Time: 7/16/2025 11:57 AM  Reason: elective      Staffing  Performed: resident   Authorized by: Jhon East MD    Performed by: Olena Park MD  Patient location during procedure: OR    Patient Condition  Indications for airway management: anesthesia  Patient position: sniffing  Sedation level: deep     Final Airway Details   Preoxygenated: yes  Final airway type: endotracheal airway  Successful airway: ETT   Successful intubation technique: direct laryngoscopy  Adjuncts used in placement: intubating stylet  Endotracheal tube insertion site: oral  Blade: Simone  Blade size: #3  ETT size (mm): 7.0  Cormack-Lehane Classification: grade I - full view of glottis  Placement verified by: chest auscultation   Measured from: lips  ETT to lips (cm): 20  Number of attempts at approach: 1  Number of other approaches attempted: 0

## 2025-07-16 NOTE — ANESTHESIA PREPROCEDURE EVALUATION
Patient: Adrianna Quinones    Procedure Information       Date/Time: 07/16/25 1030    Procedure: RIGHT DIAGNOSTIC URETEROSCOPY, POSSIBLE BIOPSY, CYSTOSCOPY WITH RETROGRADE PYELOGRAM, POSSIBLE URETER STENT PLACEMENT (Pelvis)    Location: [facility] OR  / [facility] OR    Surgeons: Lenin Melgar MD            Relevant Problems   Cardiac   (+) Hyperlipidemia      /Renal   (+) Other hydronephrosis   (+) Pyelonephritis      ID   (+) Pyelonephritis       Clinical information reviewed:    Allergies                NPO Detail:  No data recorded     Physical Exam    Airway  Mallampati: II  TM distance: >3 FB  Neck ROM: full     Cardiovascular    Dental - normal exam     Pulmonary    Abdominal            Anesthesia Plan    History of general anesthesia?: yes  History of complications of general anesthesia?: no    ASA 2     general     intravenous induction   Anesthetic plan and risks discussed with patient.    Plan discussed with attending and resident.

## 2025-07-18 DIAGNOSIS — Z85.3 HISTORY OF BREAST CANCER: ICD-10-CM

## 2025-07-18 DIAGNOSIS — N13.39 OTHER HYDRONEPHROSIS: Primary | ICD-10-CM

## 2025-07-21 ENCOUNTER — TELEMEDICINE (OUTPATIENT)
Dept: PRIMARY CARE | Facility: CLINIC | Age: 60
End: 2025-07-21
Payer: COMMERCIAL

## 2025-07-21 DIAGNOSIS — R89.6 ABNORMAL CYTOLOGY: ICD-10-CM

## 2025-07-21 DIAGNOSIS — N13.39 OTHER HYDRONEPHROSIS: ICD-10-CM

## 2025-07-21 DIAGNOSIS — F41.8 SITUATIONAL ANXIETY: Primary | ICD-10-CM

## 2025-07-21 PROCEDURE — 99214 OFFICE O/P EST MOD 30 MIN: CPT | Performed by: FAMILY MEDICINE

## 2025-07-21 RX ORDER — BUSPIRONE HYDROCHLORIDE 7.5 MG/1
7.5 TABLET ORAL 2 TIMES DAILY
Qty: 60 TABLET | Refills: 5 | Status: SHIPPED | OUTPATIENT
Start: 2025-07-21 | End: 2026-07-21

## 2025-07-21 ASSESSMENT — ENCOUNTER SYMPTOMS
NERVOUS/ANXIOUS: 1
SLEEP DISTURBANCE: 1

## 2025-07-21 NOTE — PROGRESS NOTES
Subjective   Patient ID: Adrianna Quinones is a 59 y.o. female who presents for Urinary Problem.    Patient consents to this telemedicine appointment and acknowledges its limitations.    I performed this visit using real-time telehealth tools, including an audio/video connection between Adrianna Quinones at home and Keesha Gaston DO located at  [facility] in [place], [place].    Length of telemedicine visit: 14:45 minutes      Adrianna has been undergoing further testing for her abnormal kidney imaging.  She has had repeated CT scans.  Recently she had a urinary test that showed atypical cells.  She is now scheduled for a PET scan on July 30 to further evaluate.  Because there is a concern for cancer, patient has been feeling very anxious.  She is having difficulty sleeping at night and her thoughts are racing during the day.             Review of Systems   Psychiatric/Behavioral:  Positive for sleep disturbance. The patient is nervous/anxious.        Objective   LMP 08/01/2011     Physical Exam  Constitutional:       General: She is not in acute distress.     Appearance: Normal appearance.   HENT:      Head: Normocephalic.   Pulmonary:      Effort: Pulmonary effort is normal.     Neurological:      General: No focal deficit present.      Mental Status: She is alert.     Psychiatric:         Mood and Affect: Mood normal.         Assessment/Plan   Diagnoses and all orders for this visit:  Situational anxiety  Comments:  Start buspirone bid. May take additional nighttime dose if needed.  Orders:  -     busPIRone (Buspar) 7.5 mg tablet; Take 1 tablet (7.5 mg) by mouth 2 times a day. Take with food.  Other hydronephrosis  Comments:  Following with nephrologist.  Abnormal cytology  Comments:  Atypical uroepithelial cells seen on recent catheterized urine sample. Patient scheduled for PET to further evaluate.

## 2025-07-25 ENCOUNTER — HOSPITAL ENCOUNTER (OUTPATIENT)
Dept: RADIOLOGY | Facility: CLINIC | Age: 60
Discharge: HOME | End: 2025-07-25
Payer: COMMERCIAL

## 2025-07-25 DIAGNOSIS — Z85.3 HISTORY OF BREAST CANCER: ICD-10-CM

## 2025-07-25 DIAGNOSIS — N13.39 OTHER HYDRONEPHROSIS: ICD-10-CM

## 2025-07-25 PROCEDURE — 78816 PET IMAGE W/CT FULL BODY: CPT | Mod: PS

## 2025-07-25 PROCEDURE — A9552 F18 FDG: HCPCS | Performed by: STUDENT IN AN ORGANIZED HEALTH CARE EDUCATION/TRAINING PROGRAM

## 2025-07-25 PROCEDURE — 3430000001 HC RX 343 DIAGNOSTIC RADIOPHARMACEUTICALS: Performed by: STUDENT IN AN ORGANIZED HEALTH CARE EDUCATION/TRAINING PROGRAM

## 2025-07-25 RX ORDER — FLUDEOXYGLUCOSE F 18 200 MCI/ML
13.33 INJECTION, SOLUTION INTRAVENOUS
Status: COMPLETED | OUTPATIENT
Start: 2025-07-25 | End: 2025-07-25

## 2025-07-25 RX ADMIN — FLUDEOXYGLUCOSE F 18 13.33 MILLICURIE: 200 INJECTION, SOLUTION INTRAVENOUS at 08:43

## 2025-07-30 ENCOUNTER — APPOINTMENT (OUTPATIENT)
Dept: RADIOLOGY | Facility: CLINIC | Age: 60
End: 2025-07-30
Payer: COMMERCIAL

## 2025-08-01 DIAGNOSIS — R31.21 ASYMPTOMATIC MICROSCOPIC HEMATURIA: ICD-10-CM

## 2025-08-01 DIAGNOSIS — N13.39 OTHER HYDRONEPHROSIS: Primary | ICD-10-CM

## 2025-08-06 ENCOUNTER — HOSPITAL ENCOUNTER (OUTPATIENT)
Dept: RADIOLOGY | Facility: CLINIC | Age: 60
Discharge: HOME | End: 2025-08-06
Payer: COMMERCIAL

## 2025-08-06 ENCOUNTER — APPOINTMENT (OUTPATIENT)
Dept: RADIOLOGY | Facility: CLINIC | Age: 60
End: 2025-08-06
Payer: COMMERCIAL

## 2025-08-06 DIAGNOSIS — N13.39 OTHER HYDRONEPHROSIS: ICD-10-CM

## 2025-08-06 DIAGNOSIS — R31.21 ASYMPTOMATIC MICROSCOPIC HEMATURIA: ICD-10-CM

## 2025-08-06 PROCEDURE — 72197 MRI PELVIS W/O & W/DYE: CPT

## 2025-08-06 PROCEDURE — 2550000001 HC RX 255 CONTRASTS: Performed by: STUDENT IN AN ORGANIZED HEALTH CARE EDUCATION/TRAINING PROGRAM

## 2025-08-06 PROCEDURE — 74183 MRI ABD W/O CNTR FLWD CNTR: CPT

## 2025-08-06 PROCEDURE — A9575 INJ GADOTERATE MEGLUMI 0.1ML: HCPCS | Performed by: STUDENT IN AN ORGANIZED HEALTH CARE EDUCATION/TRAINING PROGRAM

## 2025-08-06 RX ORDER — GADOTERATE MEGLUMINE 376.9 MG/ML
0.2 INJECTION INTRAVENOUS
Status: COMPLETED | OUTPATIENT
Start: 2025-08-06 | End: 2025-08-06

## 2025-08-06 RX ADMIN — GADOTERATE MEGLUMINE 14 ML: 376.9 INJECTION INTRAVENOUS at 15:39

## 2025-08-13 ENCOUNTER — APPOINTMENT (OUTPATIENT)
Dept: RADIOLOGY | Facility: CLINIC | Age: 60
End: 2025-08-13
Payer: COMMERCIAL

## 2025-08-21 ENCOUNTER — APPOINTMENT (OUTPATIENT)
Dept: UROLOGY | Facility: CLINIC | Age: 60
End: 2025-08-21
Payer: COMMERCIAL

## 2025-08-21 VITALS — TEMPERATURE: 97.8 F | WEIGHT: 153 LBS | BODY MASS INDEX: 24.59 KG/M2 | HEIGHT: 66 IN

## 2025-08-21 DIAGNOSIS — Z46.6 ENCOUNTER FOR REMOVAL OF URETERAL STENT: ICD-10-CM

## 2025-08-21 DIAGNOSIS — N13.39 OTHER HYDRONEPHROSIS: Primary | ICD-10-CM

## 2025-08-21 LAB
POC APPEARANCE, URINE: CLEAR
POC BILIRUBIN, URINE: NEGATIVE
POC BLOOD, URINE: ABNORMAL
POC COLOR, URINE: YELLOW
POC GLUCOSE, URINE: NEGATIVE MG/DL
POC KETONES, URINE: NEGATIVE MG/DL
POC LEUKOCYTES, URINE: ABNORMAL
POC NITRITE,URINE: NEGATIVE
POC PH, URINE: 6.5 PH
POC PROTEIN, URINE: NEGATIVE MG/DL
POC SPECIFIC GRAVITY, URINE: <=1.005
POC UROBILINOGEN, URINE: 0.2 EU/DL

## 2025-08-21 PROCEDURE — 81003 URINALYSIS AUTO W/O SCOPE: CPT | Performed by: STUDENT IN AN ORGANIZED HEALTH CARE EDUCATION/TRAINING PROGRAM

## 2025-08-21 PROCEDURE — 3008F BODY MASS INDEX DOCD: CPT | Performed by: STUDENT IN AN ORGANIZED HEALTH CARE EDUCATION/TRAINING PROGRAM

## 2025-08-21 PROCEDURE — 52310 CYSTOSCOPY AND TREATMENT: CPT | Performed by: STUDENT IN AN ORGANIZED HEALTH CARE EDUCATION/TRAINING PROGRAM

## 2025-08-21 PROCEDURE — 99214 OFFICE O/P EST MOD 30 MIN: CPT | Performed by: STUDENT IN AN ORGANIZED HEALTH CARE EDUCATION/TRAINING PROGRAM

## 2025-08-21 ASSESSMENT — PAIN SCALES - GENERAL: PAINLEVEL_OUTOF10: 0-NO PAIN

## 2025-09-04 ENCOUNTER — HOSPITAL ENCOUNTER (OUTPATIENT)
Dept: RADIOLOGY | Facility: CLINIC | Age: 60
Discharge: HOME | End: 2025-09-04
Payer: COMMERCIAL

## 2025-09-04 DIAGNOSIS — Z46.6 ENCOUNTER FOR REMOVAL OF URETERAL STENT: ICD-10-CM

## 2025-09-04 DIAGNOSIS — N13.39 OTHER HYDRONEPHROSIS: ICD-10-CM

## 2025-09-04 PROCEDURE — 76770 US EXAM ABDO BACK WALL COMP: CPT

## 2025-10-13 ENCOUNTER — APPOINTMENT (OUTPATIENT)
Dept: OBSTETRICS AND GYNECOLOGY | Facility: CLINIC | Age: 60
End: 2025-10-13
Payer: COMMERCIAL

## 2025-10-21 ENCOUNTER — APPOINTMENT (OUTPATIENT)
Dept: PRIMARY CARE | Facility: CLINIC | Age: 60
End: 2025-10-21
Payer: COMMERCIAL

## (undated) DEVICE — DRESSING, NON-ADHERENT, TELFA, OUCHLESS, 3 X 8 IN, STERILE

## (undated) DEVICE — Device

## (undated) DEVICE — DRAPE, SHEET, 17 X 23 IN

## (undated) DEVICE — COVER, CART, 45 X 27 X 48 IN, CLEAR

## (undated) DEVICE — IRRIGATION SYSTEM, WOUND, SURGIPHOR, 450ML, STERILE

## (undated) DEVICE — DRESSING, TRANSPARENT, TEGADERM, 4 X 4.5

## (undated) DEVICE — BANDAGE, GAUZE, COTTON, STERILE, BULKEE II, 2.25IN X 3YD

## (undated) DEVICE — TUBING SET, ASPIRATION

## (undated) DEVICE — SYRINGE, 60 CC, LUER LOCK, MONOJECT

## (undated) DEVICE — DRAPE, CHEST/BREAST

## (undated) DEVICE — SYRINGE, 60 CC, IRRIGATION, TOOMEY TIP

## (undated) DEVICE — GLOVE, SURGICAL, PROTEXIS PI , 5.5, PF, LF

## (undated) DEVICE — TOWEL PACK, STERILE, 4/PACK, BLUE

## (undated) DEVICE — MANIFOLD, 4 PORT NEPTUNE STANDARD

## (undated) DEVICE — BINDER, ABDOMINAL, 3 PANEL, 9 X 30-45 IN, SM/MED

## (undated) DEVICE — GUIDEWIRE, ULTRA TRACK, HYBRID, 0.035 IN X 150CM

## (undated) DEVICE — COLLECTION BAG, FLUID, F/STERIS LOOP, STERILE

## (undated) DEVICE — STRIP, SKIN CLOSURE, STERI STRIP, NON-REINFORCED, 0.5 X 4 IN

## (undated) DEVICE — DRESSING, TRANSPARENT, TEGADERM, 2-3/8 X 2-3/4 IN

## (undated) DEVICE — SUTURE, VICRYL, 3-0, 27 IN, SH

## (undated) DEVICE — SUTURE, VICRYL, 2-0, 27 IN, SH, UNDYED

## (undated) DEVICE — TUBING, SUCTION, CONNECTING, STERILE 0.25 X 120 IN., LF

## (undated) DEVICE — CATHETER, URETERAL 5F, OPEN END

## (undated) DEVICE — GLOVE, SURGICAL, PROTEXIS PI BLUE W/NEUTHERA, 6.0, PF, LF

## (undated) DEVICE — TUBING, AUTOFUSE, 9IN, STERILE, DISP

## (undated) DEVICE — SUTURE, MONOCRYLIC, 4-0, P3, MONO 18

## (undated) DEVICE — REST, HEAD, BAGEL, 9 IN